# Patient Record
Sex: FEMALE | Race: WHITE | NOT HISPANIC OR LATINO | Employment: FULL TIME | URBAN - METROPOLITAN AREA
[De-identification: names, ages, dates, MRNs, and addresses within clinical notes are randomized per-mention and may not be internally consistent; named-entity substitution may affect disease eponyms.]

---

## 2018-06-06 ENCOUNTER — OFFICE VISIT (OUTPATIENT)
Dept: FAMILY MEDICINE | Facility: CLINIC | Age: 63
End: 2018-06-06
Payer: COMMERCIAL

## 2018-06-06 VITALS
HEART RATE: 91 BPM | BODY MASS INDEX: 28.02 KG/M2 | DIASTOLIC BLOOD PRESSURE: 98 MMHG | RESPIRATION RATE: 18 BRPM | SYSTOLIC BLOOD PRESSURE: 142 MMHG | WEIGHT: 184.25 LBS | OXYGEN SATURATION: 96 %

## 2018-06-06 DIAGNOSIS — G47.33 OSA (OBSTRUCTIVE SLEEP APNEA): ICD-10-CM

## 2018-06-06 DIAGNOSIS — Z23 NEED FOR VACCINATION FOR ZOSTER: ICD-10-CM

## 2018-06-06 DIAGNOSIS — Z12.39 BREAST CANCER SCREENING: ICD-10-CM

## 2018-06-06 DIAGNOSIS — R09.82 POST-NASAL DRIP: ICD-10-CM

## 2018-06-06 DIAGNOSIS — E78.2 MIXED HYPERLIPIDEMIA: ICD-10-CM

## 2018-06-06 DIAGNOSIS — F51.04 CHRONIC INSOMNIA: ICD-10-CM

## 2018-06-06 DIAGNOSIS — G62.9 NEUROPATHY: ICD-10-CM

## 2018-06-06 DIAGNOSIS — I10 BENIGN ESSENTIAL HYPERTENSION: ICD-10-CM

## 2018-06-06 DIAGNOSIS — Z23 NEED FOR DIPHTHERIA-TETANUS-PERTUSSIS (TDAP) VACCINE: ICD-10-CM

## 2018-06-06 DIAGNOSIS — E55.9 VITAMIN D DEFICIENCY: ICD-10-CM

## 2018-06-06 PROCEDURE — 99204 OFFICE O/P NEW MOD 45 MIN: CPT | Mod: 25,,, | Performed by: INTERNAL MEDICINE

## 2018-06-06 PROCEDURE — 90715 TDAP VACCINE 7 YRS/> IM: CPT | Mod: ,,, | Performed by: INTERNAL MEDICINE

## 2018-06-06 PROCEDURE — 90471 IMMUNIZATION ADMIN: CPT | Mod: ,,, | Performed by: INTERNAL MEDICINE

## 2018-06-06 RX ORDER — CLONAZEPAM 1 MG/1
1 TABLET ORAL NIGHTLY PRN
Refills: 5 | COMMUNITY
Start: 2018-05-27

## 2018-06-06 RX ORDER — GABAPENTIN 300 MG/1
CAPSULE ORAL
Refills: 99 | COMMUNITY
Start: 2018-05-27 | End: 2018-06-06

## 2018-06-06 RX ORDER — LOSARTAN POTASSIUM 50 MG/1
50 TABLET ORAL DAILY
Qty: 30 TABLET | Refills: 5 | Status: SHIPPED | OUTPATIENT
Start: 2018-06-06 | End: 2018-10-15 | Stop reason: SDUPTHER

## 2018-06-06 RX ORDER — PREGABALIN 75 MG/1
75 CAPSULE ORAL 2 TIMES DAILY
Qty: 60 CAPSULE | Refills: 2 | Status: SHIPPED | OUTPATIENT
Start: 2018-06-06 | End: 2018-09-29 | Stop reason: SDUPTHER

## 2018-06-06 RX ORDER — SPIRONOLACTONE 25 MG/1
25 TABLET ORAL DAILY
Qty: 30 TABLET | Refills: 5 | Status: SHIPPED | OUTPATIENT
Start: 2018-06-06 | End: 2018-12-04 | Stop reason: SDUPTHER

## 2018-06-06 RX ORDER — FLUTICASONE PROPIONATE 50 MCG
1 SPRAY, SUSPENSION (ML) NASAL DAILY
Qty: 1 BOTTLE | Refills: 11 | Status: SHIPPED | OUTPATIENT
Start: 2018-06-06 | End: 2020-07-01

## 2018-06-06 NOTE — PROGRESS NOTES
"                                                NEW ADULT PATIENT NOTE    Subjective:     Chief Complaint:  Establish Care      History of Present Illness:   Mavis Nettles is a 63 y.o. female   Foot neuropathy- Pt c/o numbness and neuropathic pain in B feet, worse on L.  Has been getting worse over the past few years.  H/o lumbar disc herniation s/p repair, otherwise no h/o injury to legs or nerve problems.  Pt reports that this had never been worked up, she was just put on gabapentin which has not helped. She has taken up to 900 mg QHS (she can't tolerate med during the day due to sedation) w/o improvement.   Hoarse voice- Pt c/o hoarse voice since starting on the cpap.  Notes some post nasal drip, needing to "clear her throat." Mild seasonal allergies, takes PRN zyrtec.     Past Medical History:   Diagnosis Date    Cataract     Hyperlipidemia     Hypertension        Family History   Problem Relation Age of Onset    Cancer Mother     Stroke Mother     COPD Father     Hearing loss Father     Heart disease Father     Hyperlipidemia Father     Hypertension Father     Arthritis Maternal Grandmother     Stroke Maternal Grandmother     Cancer Maternal Grandfather     Arthritis Paternal Grandmother     Depression Paternal Grandmother     Diabetes Paternal Grandmother     Alcohol abuse Paternal Grandfather        Social History     Social History    Marital status:      Spouse name: N/A    Number of children: N/A    Years of education: N/A     Occupational History    Not on file.     Social History Main Topics    Smoking status: Light Tobacco Smoker    Smokeless tobacco: Never Used    Alcohol use No    Drug use: No    Sexual activity: No     Other Topics Concern    Not on file     Social History Narrative    No narrative on file       ROS:  Review of Systems   Constitutional: Negative for activity change, appetite change, fatigue and unexpected weight change.   HENT: Negative for " congestion, ear pain, rhinorrhea, sinus pain, sinus pressure, sore throat and trouble swallowing.    Eyes: Negative for pain, redness and visual disturbance.   Respiratory: Negative for cough, chest tightness, shortness of breath and wheezing.    Cardiovascular: Negative for chest pain and palpitations.   Gastrointestinal: Negative for abdominal pain, constipation, diarrhea, nausea and vomiting.   Endocrine: Negative for cold intolerance, heat intolerance, polydipsia and polyuria.   Genitourinary: Negative for difficulty urinating, dysuria, flank pain, frequency, pelvic pain, vaginal bleeding and vaginal discharge.   Musculoskeletal: Negative for arthralgias and joint swelling.   Skin: Negative for rash.   Allergic/Immunologic: Negative for environmental allergies and food allergies.   Neurological: Positive for numbness. Negative for dizziness, seizures, syncope, weakness and headaches.   Hematological: Negative for adenopathy.   Psychiatric/Behavioral: Negative for confusion, dysphoric mood and suicidal ideas. The patient is not nervous/anxious.           Current Outpatient Prescriptions:     clonazePAM (KLONOPIN) 1 MG tablet, Take 1 mg by mouth nightly as needed for Insomnia., Disp: , Rfl: 5    losartan (COZAAR) 50 MG tablet, Take 1 tablet (50 mg total) by mouth once daily., Disp: 30 tablet, Rfl: 5    multivit-minerals/ferrous fum (MULTI VITAMIN ORAL), Multi Vitamin   1qd, Disp: , Rfl:     spironolactone (ALDACTONE) 25 MG tablet, Take 1 tablet (25 mg total) by mouth once daily., Disp: 30 tablet, Rfl: 5    fluticasone (FLONASE) 50 mcg/actuation nasal spray, 1 spray (50 mcg total) by Each Nare route once daily., Disp: 1 Bottle, Rfl: 11    pregabalin (LYRICA) 75 MG capsule, Take 1 capsule (75 mg total) by mouth 2 (two) times daily., Disp: 60 capsule, Rfl: 2    varicella-zoster gE-AS01B, PF, (SHINGRIX, PF,) 50 mcg/0.5 mL injection, Inject 0.5 mLs into the muscle once., Disp: 0.5 mL, Rfl: 0        Objective:        Physical Examination:     BP (!) 142/98 (BP Location: Left arm, Patient Position: Sitting, BP Method: Medium (Manual))   Pulse 91   Resp 18   Wt 83.6 kg (184 lb 4 oz)   SpO2 96%   BMI 28.02 kg/m²     Physical Exam   Constitutional: She is oriented to person, place, and time. She appears well-developed and well-nourished. No distress.   HENT:   Head: Normocephalic and atraumatic.   Right Ear: Tympanic membrane normal.   Left Ear: Tympanic membrane normal.   Nose: Nose normal.   Mouth/Throat: Oropharynx is clear and moist. No oropharyngeal exudate (posterior cobblestoning).   Eyes: Conjunctivae and EOM are normal. Pupils are equal, round, and reactive to light.   Neck: Normal range of motion. Neck supple. No thyromegaly present.   Cardiovascular: Normal rate, regular rhythm, normal heart sounds and intact distal pulses.    No murmur heard.  Pulmonary/Chest: Effort normal and breath sounds normal.   Abdominal: Soft. Bowel sounds are normal. She exhibits no distension and no mass. There is no tenderness. There is no guarding.   Musculoskeletal: She exhibits no edema.   Lymphadenopathy:     She has no cervical adenopathy.   Neurological: She is alert and oriented to person, place, and time. She has normal strength. No sensory deficit.   Skin: Skin is warm and dry.         Assessment/Plan:   Mavis is a 63 y.o. female here to establish care.     Problem List Items Addressed This Visit        Neuro    Neuropathy    Current Assessment & Plan     Change gabapentin to lyrica.  Check TSH, CBC, B12, ESR. Consider neuro referral for EMG/NCS.         Relevant Medications    pregabalin (LYRICA) 75 MG capsule    Other Relevant Orders    CBC auto differential    Vitamin B12    TSH    Sedimentation rate       ENT    Post-nasal drip    Relevant Medications    fluticasone (FLONASE) 50 mcg/actuation nasal spray       Cardiac/Vascular    Benign essential hypertension    Current Assessment & Plan     Well controlled on cozaar  "and aldactone, elevated here, pt reports h/o "white coat hypertension."  Continue checking at home and bring us log to next visit. CMP ordered.          Relevant Medications    spironolactone (ALDACTONE) 25 MG tablet    losartan (COZAAR) 50 MG tablet    Other Relevant Orders    Comprehensive metabolic panel    Mixed hyperlipidemia    Current Assessment & Plan     Lipids ordered.          Relevant Orders    Lipid panel       Endocrine    Vitamin D deficiency    Current Assessment & Plan     Not currently on replacement. Check level.          Relevant Orders    Vitamin D       Other    Chronic insomnia    Current Assessment & Plan     Sees Dr. Coles, has been on gabapentin and clonazepam.           AP (obstructive sleep apnea)    Current Assessment & Plan     Sees Dr. Coles, on CPAP.            Other Visit Diagnoses     BMI 28.0-28.9,adult    -  Primary    Breast cancer screening        Relevant Orders    Mammo Digital Screening Bilat with CAD    Need for diphtheria-tetanus-pertussis (Tdap) vaccine        Relevant Orders    (In Office Administered) Tdap Vaccine (Completed)    Need for vaccination for zoster        Relevant Medications    varicella-zoster gE-AS01B, PF, (SHINGRIX, PF,) 50 mcg/0.5 mL injection          Health Maintenance   Topic Date Due    Lipid Panel  1955    TETANUS VACCINE  05/11/1975    Mammogram  05/11/1997    Colonoscopy  05/11/2007    Zoster Vaccine  05/11/2017    Influenza Vaccine  08/01/2018    Hepatitis C Screening  Completed    Pneumococcal PPSV23 (Medium Risk)  Excluded       Discussion:     Follow-up in about 3 months (around 9/6/2018) for HTN, labs, neuropathy.    Goals     None          Electronically signed by Sofie Higgins        "

## 2018-06-06 NOTE — PATIENT INSTRUCTIONS
You need to fast (nothing to eat or drink besides water and medications) for 8 hours before your labs are drawn.  [facility]   Address: [address] [place] [address]   Hours:   Sunday: Closed  Monday-Friday: 6AM - 5PM  Saturday: Closed    Phone: [phone]

## 2018-06-06 NOTE — ASSESSMENT & PLAN NOTE
"Well controlled on cozaar and aldactone, elevated here, pt reports h/o "white coat hypertension."  Continue checking at home and bring us log to next visit. CMP ordered.   "

## 2018-09-20 ENCOUNTER — TELEPHONE (OUTPATIENT)
Dept: FAMILY MEDICINE | Facility: CLINIC | Age: 63
End: 2018-09-20

## 2018-09-20 NOTE — TELEPHONE ENCOUNTER
Called pt regarding below message. Informed pt she has not been seen in our clinic before. Informed pt the last visit I see was with Dr. Higgins with [facility]. Pt states she will call there office and apologized for the mistake. Pt verbalized understanding with no further questions.     ----- Message from Wanda Pierre sent at 9/19/2018  4:23 PM CDT -----  Contact: self   Patient want to speak with a nurse regarding scheduling appointment before October for medical clearance, patient said she saw doctor before there was nothing in her profile showing she saw doctor before please call back at [phone] (home) '

## 2018-09-29 DIAGNOSIS — G62.9 NEUROPATHY: ICD-10-CM

## 2018-10-01 RX ORDER — PREGABALIN 75 MG/1
CAPSULE ORAL
Qty: 60 CAPSULE | Refills: 2 | Status: SHIPPED | OUTPATIENT
Start: 2018-10-01 | End: 2018-12-30 | Stop reason: SDUPTHER

## 2018-10-15 ENCOUNTER — OFFICE VISIT (OUTPATIENT)
Dept: FAMILY MEDICINE | Facility: CLINIC | Age: 63
End: 2018-10-15
Payer: COMMERCIAL

## 2018-10-15 VITALS
HEART RATE: 88 BPM | TEMPERATURE: 98 F | BODY MASS INDEX: 28.84 KG/M2 | WEIGHT: 190.31 LBS | RESPIRATION RATE: 18 BRPM | SYSTOLIC BLOOD PRESSURE: 146 MMHG | HEIGHT: 68 IN | DIASTOLIC BLOOD PRESSURE: 86 MMHG

## 2018-10-15 DIAGNOSIS — E55.9 VITAMIN D DEFICIENCY: ICD-10-CM

## 2018-10-15 DIAGNOSIS — I10 BENIGN ESSENTIAL HYPERTENSION: ICD-10-CM

## 2018-10-15 DIAGNOSIS — Z01.818 PREOPERATIVE CLEARANCE: Primary | ICD-10-CM

## 2018-10-15 DIAGNOSIS — F51.04 CHRONIC INSOMNIA: ICD-10-CM

## 2018-10-15 DIAGNOSIS — G47.33 OSA (OBSTRUCTIVE SLEEP APNEA): ICD-10-CM

## 2018-10-15 DIAGNOSIS — E78.2 MIXED HYPERLIPIDEMIA: ICD-10-CM

## 2018-10-15 DIAGNOSIS — G62.9 NEUROPATHY: ICD-10-CM

## 2018-10-15 PROCEDURE — 99214 OFFICE O/P EST MOD 30 MIN: CPT | Mod: ,,, | Performed by: NURSE PRACTITIONER

## 2018-10-15 RX ORDER — DUREZOL 0.5 MG/ML
EMULSION OPHTHALMIC
Refills: 1 | COMMUNITY
Start: 2018-08-23 | End: 2020-07-01

## 2018-10-15 RX ORDER — OFLOXACIN 3 MG/ML
SOLUTION/ DROPS OPHTHALMIC
Refills: 1 | COMMUNITY
Start: 2018-08-23 | End: 2018-12-04

## 2018-10-15 RX ORDER — KETOROLAC TROMETHAMINE 5 MG/ML
SOLUTION OPHTHALMIC
Refills: 1 | COMMUNITY
Start: 2018-08-23 | End: 2020-07-01

## 2018-10-15 RX ORDER — LOSARTAN POTASSIUM 100 MG/1
100 TABLET ORAL DAILY
Qty: 90 TABLET | Refills: 1 | Status: SHIPPED | OUTPATIENT
Start: 2018-10-15 | End: 2018-12-04 | Stop reason: SDUPTHER

## 2018-10-15 NOTE — PATIENT INSTRUCTIONS
"  Facts About Dietary Fat     Olive oil is a good source of unsaturated fat.     Eating less saturated and trans fat is one of the best things you can do for your heart. Start by finding out which fats are better to use. Then always try to use as little "bad" fat as you can.  Why eat less fat?  · Cutting down on the fat you eat can lower your blood cholesterol levels. This may help prevent clogged arteries from buildup of plaque.  · A low-fat diet can help you lose excess weight. Doing so can lower your blood pressure and reduce your chances of getting diabetes.  · A low-fat diet reduces your risk for stroke and for some cancers.  Unsaturated fat is most healthy  · When you must add fat, use unsaturated fat.  · Unsaturated fats come from plants. They include olive, canola, peanut, corn, avocado, safflower, and sunflower oils.  · Liquid (squeezable) margarine is also mostly unsaturated fat.  · In moderate amounts, unsaturated fat can even be good for your heart.  Saturated fat is less healthy  · Avoid eating saturated fat because it raises your blood cholesterol levels.  · Most saturated fat comes from animals. Foods such as butter, lard, cheese, cream, whole milk, and fatty cuts of meat are high in saturated fat.  · Some oils, such as palm and coconut oils, are also saturated fats.  Trans fat is least healthy  · Also avoid trans fat whenever possible. Even if it's not listed on the food label, look for it in the ingredients in the form of hydrogenated or partially hydrogenated oils.  · This is found in snack foods, shortening, french fries, and stick margarines.  Add flavor without fat  · Sprinkle herbs on fish, chicken, and meat, and in soups.  · Try herbs, lemon juice, or flavored vinegar on vegetables.  · Add chopped onions, garlic, and peppers to flavor beans and rice.   Date Last Reviewed: 5/11/2015  © 6626-1536 [employer]. [address] [place] [address], [place] [address]. All rights reserved. This " information is not intended as a substitute for professional medical care. Always follow your healthcare professional's instructions.        Aerobic Exercise for a Healthy Heart  Exercise is a lot more than an energy booster and a stress reliever. It also strengthens your heart muscle, lowers your blood pressure and cholesterol, and burns calories. It can also improve your resting muscle tone, and your mood.     Remember, some activity is better than none.    Choose an aerobic activity  Choose an activity that makes your heart and lungs work harder than they do when you rest or walk normally. This aerobic exercise can improve the way your heart and other muscles use oxygen. Make it fun by exercising with a friend and choosing an activity you enjoy. Here are some ideas:  · Walking  · Swimming  · Bicycling  · Stair climbing  · Dancing  · Jogging  · Gardening  Exercise regularly  If you havent been exercising regularly,  get your doctors OK first. Then start slowly.  Here are some tips:  · Begin exercising 3 times a week for 5 to 10 minutes at a time.  · When you feel comfortable, add a few minutes each session.  · Slowly build up to exercising 3 to 4 times each week. Each session should last for 40 minutes, on average, and involve moderate- to vigorous-intensity physical activity.  · If you have been given nitroglycerin, be sure to carry it when you exercise.  · If you get chest pain (angina) when youre exercising, stop what youre doing, take your nitroglycerin, and call your doctor.  Date Last Reviewed: 6/2/2016  © 6878-8626 [employer]. [address] [place] [address], [place] [address]. All rights reserved. This information is not intended as a substitute for professional medical care. Always follow your healthcare professional's instructions.

## 2018-10-15 NOTE — PROGRESS NOTES
Subjective:       Patient ID: Mavis Nettles is a 63 y.o. female.    Chief Complaint: Cataract (surgery is scheduled for next week) and Nasal Congestion    Patient presents with:  Cataract: surgery is scheduled for next week  Nasal Congestion       Subjective:    Mavis Nettles is a 63 y.o. female who presents to the office today for a preoperative consultation at the request of surgeon Dr. Boyle who plans on performing cataract removal on October 24. This consultation is requested for the specific conditions prompting preoperative evaluation.  Planned anesthesia: Monitored sedation. The patient has the following known anesthesia issues: none. Patients bleeding risk: no recent abnormal bleeding.     Procedure Risk: low  Recent Procedures: none  Recent infections: none    Heart Disease:  Angina history No  MI>6month No If yes then 5 points  MI<6 months No If yes then 10 points  Unstable Angina No If yes then 10 points  Class III No If yes then 10 points  Class IV No If yes then 20 points  Revasc <5years No and asymptomatic No  If symptomatic then needs restudy.  Pulmonary Edema history No If yes then 5 points  Pulmonary Edema <7days No If yes then 10 points  Critical Aortic Stenosis No If yes then 20 points  Abnormal EKG No  PACs No If yes 5 points        General poor health No If yes then 5 points  Age > 70 No If yes then 10 points  CVA No  HTN Yes  COPD No Consider PFTs prior to thoracotomy  Liver Failure/Alcohol withdrawal No  Anemia No    If any yes in categories greater than or equal to 10 points marked yes, procedure should be delayed, angiography and risk-factor modification should be considered. If the presence of other clinical predictors then stress test prior to procedure.   Points total: 5   0-15 = low, 15-30 = intermediate, 31+ = high    Cardiographics  ECG: no prior ECG  Echocardiogram: not done     Assessment:      63 y.o. female with planned surgery as above.    Known risk factors for perioperative  complications: None    Difficulty with intubation is not anticipated.    Cardiac Risk Estimation: low     Plan:    1. Preoperative workup as follows : Physical exam.   2. Medications reviewed. Change in medication regimen before surgery: none, continue medications until day of surgery.  3. Prophylaxis for cardiac events with perioperative beta-blockers: not indicated. Caution in patients with COPD.   4. Invasive hemodynamic monitoring perioperatively: at the discretion of anesthesiologist.  5. Deep vein thrombosis prophylaxis postoperatively:regimen to be chosen by surgical team.  6. Other measures: Preoperative alcohol abstention x 30 days.  7. Discuss with team  8. Follow up as needed.      As with all procedures, there is inherent risk of multiple complications including bleeding,  infectious, cardiac, and pulmonary.  Please stop Aspirin and NSAIDS one week prior to surgery.    All smokers should quit smoking eight or more weeks prior to procedure to minimize complications associated with smoking. Reduction or cessation of smoking for less than four to eight weeks before surgery is of questionable benefit, and has actually been shown in some studies to result in higher complication rates.  Alcohol should be used in moderation.  Any pt with cardiopulmonary disease may warrant repeat examination prior to procedure along with directions for deep breathing exercises and incentive spirometry.    Patients at high risk for complications usually warrant cardiology consultation and possibly angiography. Cardiac stress testing should be performed in patients at intermediate risk and with poor functional capacity or who are undergoing high-risk procedures, such as vascular surgery. For patients with minor clinical predictors, only patients who have poor functional capacity and are undergoing a high-risk procedure require stress testing. Patients with positive stress test results warrant cardiology consultation before  proceeding with surgery.  Predisposing risk factors for pulmonary complications include cough, dyspnea, smoking, a history of lung disease, obesity and abdominal or thoracic surgery.    Any pts > 70 years old may be at risk for delirium or cardiac complications.  Furthermore, diabetic patients may be at risk for infection or prolonged healing.      Jossie Roa  10/15/2018 9:47 AM      Review of Systems   Constitutional: Negative for activity change, appetite change and fever.        Overweight   HENT: Negative for congestion, ear discharge, ear pain, sore throat, trouble swallowing and voice change.         Seasonal allergies   Eyes: Negative for photophobia, pain, discharge and visual disturbance.   Respiratory: Negative for cough, chest tightness and shortness of breath.    Cardiovascular: Negative for chest pain and palpitations.        Hypertension, hyperlipidemia   Gastrointestinal: Negative for abdominal pain, nausea and vomiting.   Endocrine: Negative for cold intolerance and heat intolerance.        Vitamin d deficiency   Genitourinary: Negative for difficulty urinating and dysuria.   Musculoskeletal: Negative for arthralgias and gait problem.   Skin: Negative for rash.   Allergic/Immunologic: Negative for immunocompromised state.   Neurological: Negative for speech difficulty and headaches.        Neuropathy   Psychiatric/Behavioral: Positive for sleep disturbance (insomnia). Negative for confusion, self-injury and suicidal ideas.       Past Medical History:   Diagnosis Date    Cataract     Hyperlipidemia     Hypertension       Past Surgical History:   Procedure Laterality Date    APPENDECTOMY      Cholecystectomy  15 yrs ago    CHOLECYSTECTOMY      COLON SURGERY      HYSTERECTOMY  21yrs ago    LAMINECTOMY  21yrs ago    OOPHORECTOMY  21yrs ago    SPINE SURGERY      TUBAL LIGATION  26yrs ago       Family History   Problem Relation Age of Onset    Cancer Mother     Stroke Mother     COPD  Father     Hearing loss Father     Heart disease Father     Hyperlipidemia Father     Hypertension Father     Arthritis Maternal Grandmother     Stroke Maternal Grandmother     Cancer Maternal Grandfather     Arthritis Paternal Grandmother     Depression Paternal Grandmother     Diabetes Paternal Grandmother     Alcohol abuse Paternal Grandfather        Social History     Socioeconomic History    Marital status:      Spouse name: None    Number of children: None    Years of education: None    Highest education level: None   Social Needs    Financial resource strain: None    Food insecurity - worry: None    Food insecurity - inability: None    Transportation needs - medical: None    Transportation needs - non-medical: None   Occupational History    None   Tobacco Use    Smoking status: Light Tobacco Smoker    Smokeless tobacco: Never Used   Substance and Sexual Activity    Alcohol use: No    Drug use: No    Sexual activity: No   Other Topics Concern    None   Social History Narrative    None       Current Outpatient Medications   Medication Sig Dispense Refill    clonazePAM (KLONOPIN) 1 MG tablet Take 1 mg by mouth nightly as needed for Insomnia.  5    fluticasone (FLONASE) 50 mcg/actuation nasal spray 1 spray (50 mcg total) by Each Nare route once daily. 1 Bottle 11    losartan (COZAAR) 100 MG tablet Take 1 tablet (100 mg total) by mouth once daily. 90 tablet 1    LYRICA 75 mg capsule TAKE 1 CAPSULE BY MOUTH TWICE A DAY 60 capsule 2    multivit-minerals/ferrous fum (MULTI VITAMIN ORAL) Multi Vitamin    1qd      spironolactone (ALDACTONE) 25 MG tablet Take 1 tablet (25 mg total) by mouth once daily. 30 tablet 5    DUREZOL 0.05 % Drop ophthalmic solution INSTILL 1 DROP IN THE LEFT EYE TWICE A DAY  1    ketorolac 0.5% (ACULAR) 0.5 % Drop 1 DROP IN THE LEFT EYE 4 X A DAY XWK. THEN 3X A DAY 1WK. 2X A DAY X1WK THEN 1X A DAY X1WK  1    ofloxacin (OCUFLOX) 0.3 % ophthalmic  "solution INSTILL 1 DROP IN THE LEFT EYE 4 TIMES A DAY 3 DAYS PRIOR TO SURGERY THEN 1 WEEK AFTER  1     No current facility-administered medications for this visit.        Review of patient's allergies indicates:  No Known Allergies  Objective:    HPI     Cataract      Additional comments: surgery is scheduled for next week          Last edited by Tamika Ovalle MA on 10/15/2018  9:29 AM. (History)      Blood pressure (!) 146/86, pulse 88, temperature 98.1 °F (36.7 °C), resp. rate 18, height 5' 8" (1.727 m), weight 86.3 kg (190 lb 4.8 oz). Body mass index is 28.94 kg/m².   Physical Exam   Constitutional: She is oriented to person, place, and time. She appears well-developed. She is cooperative. No distress.   overweight   HENT:   Head: Normocephalic and atraumatic.   Right Ear: Tympanic membrane and external ear normal.   Left Ear: Tympanic membrane and external ear normal.   Nose: Nose normal.   Mouth/Throat: Oropharynx is clear and moist.   Eyes: Conjunctivae, EOM and lids are normal. Pupils are equal, round, and reactive to light. Lids are everted and swept, no foreign bodies found. Right pupil is round and reactive. Left pupil is round and reactive.   Neck: Trachea normal and normal range of motion. Neck supple.   Cardiovascular: Normal rate, regular rhythm, S1 normal, S2 normal, normal heart sounds and intact distal pulses.   No murmur heard.  Pulmonary/Chest: Effort normal and breath sounds normal. No respiratory distress. She has no wheezes.   Abdominal: Soft. Bowel sounds are normal. She exhibits no distension. There is no tenderness. There is no rigidity and no guarding.   Musculoskeletal: Normal range of motion.   Lymphadenopathy:     She has no cervical adenopathy.        Right cervical: No superficial cervical adenopathy present.       Left cervical: No superficial cervical adenopathy present.     She has no axillary adenopathy.   Neurological: She is alert and oriented to person, place, and time. "   Skin: Skin is warm and dry. Capillary refill takes less than 2 seconds.   Psychiatric: She has a normal mood and affect. Her speech is normal and behavior is normal. Judgment and thought content normal. Cognition and memory are normal.   Nursing note and vitals reviewed.          Assessment:       1. Preoperative clearance    2. Benign essential hypertension    3. Mixed hyperlipidemia    4. Vitamin D deficiency    5. Chronic insomnia    6. AP (obstructive sleep apnea)    7. Neuropathy        Plan:       Mavis was seen today for cataract and nasal congestion.    Diagnoses and all orders for this visit:    Preoperative clearance  -Patient is cleared for surgery.     Benign essential hypertension  -     losartan (COZAAR) 100 MG tablet; Take 1 tablet (100 mg total) by mouth once daily.  -Patient has consistently been greater than 140/90 at home she states.  Patient's dose for losartan increased from 50 mg to 100 mg with patient agreeable to dose increase.  Patient states she has been on 50 mg for several years and has gained 10-15 lbs since that time.    Mixed hyperlipidemia  -Limit red meat, butter, fried foods, cheese, and other foods that have a lot of saturated fat. Consume more: lean meats, fish, fruits, vegetables, whole grains, beans, lentils, and nuts.  Weight loss, and 30-45 min of cardiovascular exercise daily.     Vitamin D deficiency  -Continue current supplement.    Chronic insomnia  -Continue current therapy.    AP (obstructive sleep apnea)  -Continue current therapy.    Neuropathy  -Continue with current medication and treatment plan.         Patient is cleared for surgery.

## 2018-10-24 ENCOUNTER — ANESTHESIA EVENT (OUTPATIENT)
Dept: SURGERY | Facility: AMBULARY SURGERY CENTER | Age: 63
End: 2018-10-24
Payer: COMMERCIAL

## 2018-10-24 ENCOUNTER — ANESTHESIA (OUTPATIENT)
Dept: SURGERY | Facility: AMBULARY SURGERY CENTER | Age: 63
End: 2018-10-24
Payer: COMMERCIAL

## 2018-10-24 ENCOUNTER — HOSPITAL ENCOUNTER (OUTPATIENT)
Facility: AMBULARY SURGERY CENTER | Age: 63
Discharge: HOME | End: 2018-10-24
Attending: OPHTHALMOLOGY | Admitting: OPHTHALMOLOGY
Payer: COMMERCIAL

## 2018-10-24 DIAGNOSIS — H26.9 CATARACT, RIGHT: ICD-10-CM

## 2018-10-24 PROCEDURE — 66984 XCAPSL CTRC RMVL W/O ECP: CPT | Performed by: OPHTHALMOLOGY

## 2018-10-24 PROCEDURE — D9220A PRA ANESTHESIA: Mod: CRNA,,, | Performed by: NURSE ANESTHETIST, CERTIFIED REGISTERED

## 2018-10-24 PROCEDURE — D9220A PRA ANESTHESIA: Mod: ANES,,, | Performed by: ANESTHESIOLOGY

## 2018-10-24 DEVICE — LENS 21.0 ACRYSOF: Type: IMPLANTABLE DEVICE | Site: EYE | Status: FUNCTIONAL

## 2018-10-24 RX ORDER — ONDANSETRON 2 MG/ML
INJECTION INTRAMUSCULAR; INTRAVENOUS
Status: DISCONTINUED | OUTPATIENT
Start: 2018-10-24 | End: 2018-10-24

## 2018-10-24 RX ORDER — FENTANYL CITRATE 50 UG/ML
INJECTION, SOLUTION INTRAMUSCULAR; INTRAVENOUS
Status: DISCONTINUED | OUTPATIENT
Start: 2018-10-24 | End: 2018-10-24

## 2018-10-24 RX ORDER — CYCLOPENTOLATE HYDROCHLORIDE 10 MG/ML
1 SOLUTION/ DROPS OPHTHALMIC
Status: DISCONTINUED | OUTPATIENT
Start: 2018-10-24 | End: 2018-10-24 | Stop reason: HOSPADM

## 2018-10-24 RX ORDER — PHENYLEPHRINE HYDROCHLORIDE 25 MG/ML
1 SOLUTION/ DROPS OPHTHALMIC
Status: COMPLETED | OUTPATIENT
Start: 2018-10-24 | End: 2018-10-24

## 2018-10-24 RX ORDER — CYCLOPENTOLATE HYDROCHLORIDE 10 MG/ML
1 SOLUTION/ DROPS OPHTHALMIC
Status: COMPLETED | OUTPATIENT
Start: 2018-10-24 | End: 2018-10-24

## 2018-10-24 RX ORDER — SODIUM CHLORIDE, SODIUM LACTATE, POTASSIUM CHLORIDE, CALCIUM CHLORIDE 600; 310; 30; 20 MG/100ML; MG/100ML; MG/100ML; MG/100ML
INJECTION, SOLUTION INTRAVENOUS CONTINUOUS PRN
Status: DISCONTINUED | OUTPATIENT
Start: 2018-10-24 | End: 2018-10-24

## 2018-10-24 RX ORDER — MIDAZOLAM HYDROCHLORIDE 1 MG/ML
INJECTION, SOLUTION INTRAMUSCULAR; INTRAVENOUS
Status: DISCONTINUED | OUTPATIENT
Start: 2018-10-24 | End: 2018-10-24

## 2018-10-24 RX ORDER — MIDAZOLAM HYDROCHLORIDE 1 MG/ML
INJECTION INTRAMUSCULAR; INTRAVENOUS
Status: COMPLETED
Start: 2018-10-24 | End: 2018-10-24

## 2018-10-24 RX ORDER — TROPICAMIDE 10 MG/ML
1 SOLUTION/ DROPS OPHTHALMIC
Status: DISCONTINUED | OUTPATIENT
Start: 2018-10-24 | End: 2018-10-24 | Stop reason: HOSPADM

## 2018-10-24 RX ORDER — POLYMYXIN B SULFATE AND TRIMETHOPRIM 1; 10000 MG/ML; [USP'U]/ML
SOLUTION OPHTHALMIC
Status: DISCONTINUED | OUTPATIENT
Start: 2018-10-24 | End: 2018-10-24 | Stop reason: HOSPADM

## 2018-10-24 RX ORDER — TROPICAMIDE 10 MG/ML
1 SOLUTION/ DROPS OPHTHALMIC
Status: COMPLETED | OUTPATIENT
Start: 2018-10-24 | End: 2018-10-24

## 2018-10-24 RX ORDER — LIDOCAINE HYDROCHLORIDE 40 MG/ML
1 INJECTION, SOLUTION RETROBULBAR
Status: COMPLETED | OUTPATIENT
Start: 2018-10-24 | End: 2018-10-24

## 2018-10-24 RX ORDER — PHENYLEPHRINE HYDROCHLORIDE 100 MG/ML
1 SOLUTION/ DROPS OPHTHALMIC
Status: DISCONTINUED | OUTPATIENT
Start: 2018-10-24 | End: 2018-10-24 | Stop reason: HOSPADM

## 2018-10-24 RX ORDER — ONDANSETRON 2 MG/ML
INJECTION INTRAMUSCULAR; INTRAVENOUS
Status: COMPLETED
Start: 2018-10-24 | End: 2018-10-24

## 2018-10-24 RX ORDER — PROPARACAINE HYDROCHLORIDE 5 MG/ML
1 SOLUTION/ DROPS OPHTHALMIC
Status: DISCONTINUED | OUTPATIENT
Start: 2018-10-24 | End: 2018-10-24 | Stop reason: HOSPADM

## 2018-10-24 RX ORDER — SODIUM CHLORIDE 0.9 % (FLUSH) 0.9 %
3 SYRINGE (ML) INJECTION EVERY 8 HOURS
Status: DISCONTINUED | OUTPATIENT
Start: 2018-10-24 | End: 2018-10-24 | Stop reason: HOSPADM

## 2018-10-24 RX ORDER — FENTANYL CITRATE 50 UG/ML
INJECTION, SOLUTION INTRAMUSCULAR; INTRAVENOUS
Status: COMPLETED
Start: 2018-10-24 | End: 2018-10-24

## 2018-10-24 RX ADMIN — LIDOCAINE HYDROCHLORIDE 1 DROP: 40 INJECTION, SOLUTION RETROBULBAR at 06:10

## 2018-10-24 RX ADMIN — PHENYLEPHRINE HYDROCHLORIDE 1 DROP: 25 SOLUTION/ DROPS OPHTHALMIC at 07:10

## 2018-10-24 RX ADMIN — ONDANSETRON 4 MG: 2 INJECTION INTRAMUSCULAR; INTRAVENOUS at 08:10

## 2018-10-24 RX ADMIN — MIDAZOLAM HYDROCHLORIDE 2 MG: 1 INJECTION, SOLUTION INTRAMUSCULAR; INTRAVENOUS at 08:10

## 2018-10-24 RX ADMIN — SODIUM CHLORIDE, SODIUM LACTATE, POTASSIUM CHLORIDE, CALCIUM CHLORIDE: 600; 310; 30; 20 INJECTION, SOLUTION INTRAVENOUS at 08:10

## 2018-10-24 RX ADMIN — CYCLOPENTOLATE HYDROCHLORIDE 1 DROP: 10 SOLUTION/ DROPS OPHTHALMIC at 07:10

## 2018-10-24 RX ADMIN — TROPICAMIDE 1 DROP: 10 SOLUTION/ DROPS OPHTHALMIC at 07:10

## 2018-10-24 RX ADMIN — LIDOCAINE HYDROCHLORIDE 1 DROP: 40 INJECTION, SOLUTION RETROBULBAR at 08:10

## 2018-10-24 RX ADMIN — PROPARACAINE HYDROCHLORIDE 1 DROP: 5 SOLUTION/ DROPS OPHTHALMIC at 07:10

## 2018-10-24 RX ADMIN — FENTANYL CITRATE 50 MCG: 50 INJECTION, SOLUTION INTRAMUSCULAR; INTRAVENOUS at 08:10

## 2018-10-24 NOTE — ANESTHESIA POSTPROCEDURE EVALUATION
"Anesthesia Post Evaluation    Patient: Mavis Nettles    Procedure(s) Performed: Procedure(s) (LRB):  EXTRACTION, CATARACT, WITH IOL INSERTION (Right)    Final Anesthesia Type: MAC  Patient location during evaluation: PACU  Patient participation: Yes- Able to Participate  Level of consciousness: awake and alert and oriented  Post-procedure vital signs: reviewed and stable  Pain management: adequate  Airway patency: patent  PONV status at discharge: No PONV  Anesthetic complications: no      Cardiovascular status: blood pressure returned to baseline and stable  Respiratory status: unassisted and spontaneous ventilation  Hydration status: euvolemic  Follow-up not needed.        Visit Vitals  BP (!) 115/59   Pulse 62   Temp 36.9 °C (98.4 °F)   Resp 20   Ht 5' 8" (1.727 m)   Wt 86.2 kg (190 lb)   SpO2 96%   Breastfeeding? No   BMI 28.89 kg/m²       Pain/Leilani Score: Pain Assessment Performed: Yes (10/24/2018  9:15 AM)  Presence of Pain: denies (10/24/2018  9:15 AM)  Leilani Score: 10 (10/24/2018  9:15 AM)        "

## 2018-10-24 NOTE — DISCHARGE INSTRUCTIONS
Anesthesia information    Anesthesia Safety      You have been given medicine  to sedate you during your procedure today. This may have included both a pain medicine and sleeping medicine. Most of the effects have worn off; however, you may continue to have some drowsiness for the next  24 hours. Anesthesia and pain medicines can cause nausea, sleepiness, dizziness and  constipation.    HOME CARE:  1) For the next EIGHT HOURS, you should be watched by a responsible adult to look for any worsening of your condition.  2) DO NOT DRINK any ALCOHOL for the next 24 HOURS.  3) DO NOT DRIVE or operate dangerous machinery during the next 24 HOURS.  FOLLOW UP with your doctor or this facility if you are not alert and back to your usual level of activity within 24 hrs.  GET PROMPT MEDICAL ATTENTION if any of the following occur:  -- Increased drowsiness  -- Increased weakness or dizziness  -- Repeated vomiting  -- If you cannot be awakened    After Cataract Surgery    You may remove your shield on the day of surgery. You may see double and your vision may be blurry when the patch is first removed. If you are uncomfortable with these symptoms, you may leave  the shield on    Tape the eye sheild over your eye at bedtime and naptime for 7 days. Tape is provided in your kit.    Do not vigorously press or rub your eye after surgery for 6 weeks.    Clean around your eyelids regularly being careful not to press on the upper lid.    You may shower, bathe and shampoo but avoid getting water, soap or shampoo in your eye for 2 weeks.  No swimming for 2 weeks.    Resume normal activities tomorrow, but no lifting or bending for 1 week.  Rest today. Reading, writing or watching TV are fine.     No eye makeup for 1 week, no eye creams for 24 hrs. Caution using perfumes or colognes.    You may be sensitive to light and  may wear sunglasses provided when outdoors     Your glasses may not be the right strength for your eye after surgery.  You  may wear your old glasses or go without.  You may find [employer] reading glasses helpful.    Symptoms you may experience the first day:  Blurry vision, Double Vision, Redish color to objects, Sensitivity to light, flickering light, Burning or scratchy feeling in eye.    Serious Problems that could arise that need immediate reporting to the Dr:     A sudden Decrease or loss of vision in either eye  Your vision is becoming worse instead of better each day  You develop a shadow in your vision  You see flashing lights  You have a continuous ache or increased pain not relieved by over the counter pain medication.    If you have any questions, please re read this carefully. If your  Question is still not answered call the office. [phone] . We are here to help you!    Emergency after office hours number is     Using eye drops:  Wash hands, shake drops well, tilt head back, open eye and look up, gently pull lower eyelid down to form a pocket, place drop into the pocket formed, close eye    Be careful not to touch your eyelashes or finger to the tip of the bottle. Allow 5 m between each drop.    Durezol 1 drop 4x daily- every 2 hrs til bedtime today, then decreasing dosage schedule  Ofloxacin 1 drop 4x daily  Ketorolac 1 drop 4 x daily

## 2018-10-24 NOTE — TRANSFER OF CARE
"Anesthesia Transfer of Care Note    Patient: Mavis Nettles    Procedure(s) Performed: Procedure(s) (LRB):  EXTRACTION, CATARACT, WITH IOL INSERTION (Right)    Patient location: PACU    Anesthesia Type: MAC    Transport from OR: Transported from OR on room air with adequate spontaneous ventilation    Post pain: adequate analgesia    Post assessment: no apparent anesthetic complications and tolerated procedure well    Post vital signs: stable    Level of consciousness: awake    Nausea/Vomiting: no nausea/vomiting    Complications: none    Transfer of care protocol was followed      Last vitals:   Visit Vitals  /60   Pulse 61   Temp 36.9 °C (98.4 °F)   Resp 18   Ht 5' 8" (1.727 m)   Wt 86.2 kg (190 lb)   SpO2 (!) 94%   Breastfeeding? No   BMI 28.89 kg/m²     "

## 2018-10-24 NOTE — BRIEF OP NOTE
[facility]  Brief Operative Note     SUMMARY     Surgery Date: 10/24/2018     Surgeon(s) and Role:     * Marito Boyle II, MD - Primary    Assisting Surgeon: None    Pre-op Diagnosis:  Nuclear sclerotic cataract of right eye [H25.11]    Post-op Diagnosis:  Post-Op Diagnosis Codes:     * Nuclear sclerotic cataract of right eye [H25.11]    Procedure(s) (LRB):  EXTRACTION, CATARACT, WITH IOL INSERTION (Right)    Anesthesia: Monitor Anesthesia Care    Description of the findings of the procedure: cat ext    Findings/Key Components: iol inserted    Estimated Blood Loss: * No values recorded between 10/24/2018  9:00 AM and 10/24/2018  9:13 AM *         Specimens:   Specimen (12h ago, onward)    None          Discharge Note    SUMMARY     Admit Date: 10/24/2018    Discharge Date and Time:  10/24/2018 9:18 AM    Hospital Course (synopsis of major diagnoses, care, treatment, and services provided during the course of the hospital stay): outpatient     Final Diagnosis: Post-Op Diagnosis Codes:     * Nuclear sclerotic cataract of right eye [H25.11]    Disposition: Home or Self Care    Follow Up/Patient Instructions:     Medications:  Reconciled Home Medications:      Medication List      CONTINUE taking these medications    clonazePAM 1 MG tablet  Commonly known as:  KLONOPIN  Take 1 mg by mouth nightly as needed for Insomnia.     DUREZOL 0.05 % Drop ophthalmic solution  Generic drug:  difluprednate  INSTILL 1 DROP IN THE LEFT EYE TWICE A DAY     fluticasone 50 mcg/actuation nasal spray  Commonly known as:  FLONASE  1 spray (50 mcg total) by Each Nare route once daily.     ketorolac 0.5% 0.5 % Drop  Commonly known as:  ACULAR  1 DROP IN THE LEFT EYE 4 X A DAY XWK. THEN 3X A DAY 1WK. 2X A DAY X1WK THEN 1X A DAY X1WK     losartan 100 MG tablet  Commonly known as:  COZAAR  Take 1 tablet (100 mg total) by mouth once daily.     LYRICA 75 MG capsule  Generic drug:  pregabalin  TAKE 1 CAPSULE BY MOUTH TWICE A  DAY     MULTI VITAMIN ORAL  Multi Vitamin    1qd     ofloxacin 0.3 % ophthalmic solution  Commonly known as:  OCUFLOX  INSTILL 1 DROP IN THE LEFT EYE 4 TIMES A DAY 3 DAYS PRIOR TO SURGERY THEN 1 WEEK AFTER     spironolactone 25 MG tablet  Commonly known as:  ALDACTONE  Take 1 tablet (25 mg total) by mouth once daily.          Discharge Procedure Orders   Diet Adult Regular     Lifting restrictions     Call MD for:  temperature >100.4     Call MD for:  persistent nausea and vomiting or diarrhea     Call MD for:  severe uncontrolled pain     Call MD for:  redness, tenderness, or signs of infection (pain, swelling, redness, odor or green/yellow discharge around incision site)     Call MD for:   Order Comments: Call MD if vision deteriorates     Leave dressing on - Keep it clean, dry, and intact until clinic visit   Order Comments: Remove shield to insert eye drops.     Follow-up Information     Marito Boyle II, MD In 1 day.    Specialty:  Ophthalmology  Contact information:  [address] [address]  [phone]

## 2018-10-24 NOTE — ANESTHESIA PREPROCEDURE EVALUATION
10/24/2018  Mavis Nettles is a 63 y.o., female.    Anesthesia Evaluation    I have reviewed the Patient Summary Reports.    I have reviewed the Nursing Notes.   I have reviewed the Medications.     Review of Systems  Anesthesia Hx:  No problems with previous Anesthesia    Social:  Smoker    Cardiovascular:   Hypertension, well controlled hyperlipidemia    Pulmonary:   Sleep Apnea    Renal/:  Renal/ Normal     Neurological:   Peripheral Neuropathy    Endocrine:  Endocrine Normal        Physical Exam  General:  Well nourished    Airway/Jaw/Neck:  Airway Findings: Mouth Opening: Normal Tongue: Normal  General Airway Assessment: Adult  Oropharynx Findings:  Mallampati: II  Jaw/Neck Findings:  Neck ROM: Normal ROM     Eyes/Ears/Nose:  Eyes/Ears/Nose Findings:    Dental:  Dental Findings:   Chest/Lungs:  Chest/Lungs Findings: Normal Respiratory Rate     Heart/Vascular:  Heart Findings: Rate: Normal  Rhythm: Regular Rhythm        Mental Status:  Mental Status Findings:  Cooperative, Alert and Oriented         Anesthesia Plan  Type of Anesthesia, risks & benefits discussed:  Anesthesia Type:  general, MAC  Patient's Preference:   Intra-op Monitoring Plan: standard ASA monitors  Intra-op Monitoring Plan Comments:   Post Op Pain Control Plan: multimodal analgesia  Post Op Pain Control Plan Comments:   Induction:   IV  Beta Blocker:  Patient is not currently on a Beta-Blocker (No further documentation required).       Informed Consent: Patient understands risks and agrees with Anesthesia plan.  Questions answered. Anesthesia consent signed with patient.  ASA Score: 2     Day of Surgery Review of History & Physical:  There are no significant changes.   H&P completed by Anesthesiologist.       Ready For Surgery From Anesthesia Perspective.

## 2018-10-24 NOTE — OP NOTE
[facility]  Opthalmology  Operative Note    SUMMARY     Date of Procedure: 10/24/2018     Procedure: Procedure(s) (LRB):  EXTRACTION, CATARACT, WITH IOL INSERTION (Right)       Surgeon(s) and Role:     * Marito Boyle II, MD - Primary      Pre-Operative Diagnosis: Cataract of right eye.    Post-Operative Diagnosis: Cataract of right eye.    Anesthesia: Topical    Procedure in Detail: The patient was brought from the Day Surgery Unit to the holding area where intravenous infusion was started and multiple doses of 4% lidocaine instilled into the right eye to achieve topical anesthesia.  The patient was then brought to the operating room where the operative field was prepped with Betadine and draped with sterile drapes.  The patient was then positioned under the operating microscope where a self retaining wire lid speculum was placed into the operative eye.  A partial thickness temporal clear corneal groove incision was then made near the limbus approximately 3mm in length using a 15 degree blade.  The same 15 degree blade was then used to create a limbal side port incision.  Additional intracameral anesthesia was obtained by the injection of 1% preservative free lidocaine through the side port incision.  The anterior chamber was then re-deepened by the injection of Viscoat through the side port incision.  The previously made temporal groove incision was then converted to a clear corneal valve incision using a 2.5 mm keratome.  A cystotome was then passed through this temporal clear corneal incision and used to raise an anterior lenticular capsular flap which was then grasped with Utrata forceps and a circular tear capsulotomy performed.  Hydrodissection of the lens nucleus from its surrounding cortical and capsular attachments was accomplished by the injection of Balanced Salt Solution through a 27 gauge flat hydrodissection cannula.   The phacoemulsification tip was then passed through the  temporal incision and used to sculpt the central nucleus and to sculpt a peripheral nuclear groove nasally.  A Knolle spatula was then passed through the side port incision and used to rotate the nucleus 90 degrees clockwise where a second peripheral nuclear groove was sculpted.  In like fashion a third and fourth groove were sculpted at 90 degrees intervals until the depths of the groove were sufficiently thin to allow fracture the nucleus into 4 fragments which were then individually emulsified in the pupillary plane.  The phacoemulsification tip was then withdrawn and the irrigation/aspiration cannula used to aspirate residual cortical material from the capsular bag.  The capsular bag was then reinflated and the anterior chamber redeepened by the injection of Provisc.  A posterior chamber intraocular lens was then placed into its insertion device and injected through the temporal clear corneal incision such that the lens was placed entirely inside the capsular bag.  The irrigation/aspiration cannula ws then used once again to aspirate residual Viscoelastic from the anterior chamber.  At the end of this procedure the anterior chamber was redeepened by the injection of additional Balanced Salt Solution through the side port incision.  The side port incision and temporal clear corneal incision were then tested with Weck-xochitl sponges and found to be watertight.  The eye was then irrigated with antibiotic solution after which the lid speculum and drapes were removed and an eye shield applied.  The patient tolerated the procedure well and was taken from the operating room in satisfactory condition.                  Estimated Blood Loss (EBL): * No values recorded between 10/24/2018  9:00 AM and 10/24/2018  9:13 AM *           Implants:   Implant Name Type Inv. Item Serial No.  Lot No. LRB No. Used   LENS 21.0 ACRYSOF - [id number]  LENS 21.0 ACRYSOF [id number] Exclusively.in  Right 1           Disposition:  Home/Self Care

## 2018-10-25 VITALS
WEIGHT: 190 LBS | TEMPERATURE: 98 F | OXYGEN SATURATION: 96 % | DIASTOLIC BLOOD PRESSURE: 66 MMHG | SYSTOLIC BLOOD PRESSURE: 110 MMHG | HEIGHT: 68 IN | BODY MASS INDEX: 28.79 KG/M2 | RESPIRATION RATE: 20 BRPM | HEART RATE: 55 BPM

## 2018-11-13 ENCOUNTER — ANESTHESIA EVENT (OUTPATIENT)
Dept: SURGERY | Facility: AMBULARY SURGERY CENTER | Age: 63
End: 2018-11-13
Payer: COMMERCIAL

## 2018-11-14 ENCOUNTER — ANESTHESIA (OUTPATIENT)
Dept: SURGERY | Facility: AMBULARY SURGERY CENTER | Age: 63
End: 2018-11-14
Payer: COMMERCIAL

## 2018-11-14 ENCOUNTER — HOSPITAL ENCOUNTER (OUTPATIENT)
Facility: AMBULARY SURGERY CENTER | Age: 63
Discharge: HOME | End: 2018-11-14
Attending: OPHTHALMOLOGY | Admitting: OPHTHALMOLOGY
Payer: COMMERCIAL

## 2018-11-14 DIAGNOSIS — H26.9 CATARACT, LEFT: ICD-10-CM

## 2018-11-14 PROCEDURE — 66984 XCAPSL CTRC RMVL W/O ECP: CPT | Performed by: OPHTHALMOLOGY

## 2018-11-14 PROCEDURE — D9220A PRA ANESTHESIA: Mod: CRNA,,, | Performed by: NURSE ANESTHETIST, CERTIFIED REGISTERED

## 2018-11-14 PROCEDURE — D9220A PRA ANESTHESIA: Mod: ANES,,, | Performed by: ANESTHESIOLOGY

## 2018-11-14 RX ORDER — SODIUM CHLORIDE, SODIUM LACTATE, POTASSIUM CHLORIDE, CALCIUM CHLORIDE 600; 310; 30; 20 MG/100ML; MG/100ML; MG/100ML; MG/100ML
125 INJECTION, SOLUTION INTRAVENOUS CONTINUOUS
Status: DISCONTINUED | OUTPATIENT
Start: 2018-11-14 | End: 2018-11-14 | Stop reason: HOSPADM

## 2018-11-14 RX ORDER — FENTANYL CITRATE 50 UG/ML
INJECTION, SOLUTION INTRAMUSCULAR; INTRAVENOUS
Status: COMPLETED
Start: 2018-11-14 | End: 2018-11-14

## 2018-11-14 RX ORDER — CYCLOPENTOLATE HYDROCHLORIDE 10 MG/ML
1 SOLUTION/ DROPS OPHTHALMIC
Status: DISCONTINUED | OUTPATIENT
Start: 2018-11-14 | End: 2018-11-14 | Stop reason: HOSPADM

## 2018-11-14 RX ORDER — PHENYLEPHRINE HYDROCHLORIDE 100 MG/ML
1 SOLUTION/ DROPS OPHTHALMIC
Status: DISCONTINUED | OUTPATIENT
Start: 2018-11-14 | End: 2018-11-14 | Stop reason: HOSPADM

## 2018-11-14 RX ORDER — POLYMYXIN B SULFATE AND TRIMETHOPRIM 1; 10000 MG/ML; [USP'U]/ML
SOLUTION OPHTHALMIC
Status: DISCONTINUED | OUTPATIENT
Start: 2018-11-14 | End: 2018-11-14 | Stop reason: HOSPADM

## 2018-11-14 RX ORDER — PROPARACAINE HYDROCHLORIDE 5 MG/ML
1 SOLUTION/ DROPS OPHTHALMIC
Status: DISCONTINUED | OUTPATIENT
Start: 2018-11-14 | End: 2018-11-14 | Stop reason: HOSPADM

## 2018-11-14 RX ORDER — ONDANSETRON 2 MG/ML
INJECTION INTRAMUSCULAR; INTRAVENOUS
Status: COMPLETED
Start: 2018-11-14 | End: 2018-11-14

## 2018-11-14 RX ORDER — CIPROFLOXACIN HYDROCHLORIDE 3 MG/ML
SOLUTION/ DROPS OPHTHALMIC
Status: DISCONTINUED
Start: 2018-11-14 | End: 2018-11-14 | Stop reason: HOSPADM

## 2018-11-14 RX ORDER — PROPARACAINE HYDROCHLORIDE 5 MG/ML
SOLUTION/ DROPS OPHTHALMIC
Status: DISCONTINUED | OUTPATIENT
Start: 2018-11-14 | End: 2018-11-14 | Stop reason: HOSPADM

## 2018-11-14 RX ORDER — ONDANSETRON 2 MG/ML
INJECTION INTRAMUSCULAR; INTRAVENOUS
Status: DISCONTINUED | OUTPATIENT
Start: 2018-11-14 | End: 2018-11-14

## 2018-11-14 RX ORDER — SODIUM CHLORIDE, SODIUM LACTATE, POTASSIUM CHLORIDE, CALCIUM CHLORIDE 600; 310; 30; 20 MG/100ML; MG/100ML; MG/100ML; MG/100ML
INJECTION, SOLUTION INTRAVENOUS CONTINUOUS
Status: DISCONTINUED | OUTPATIENT
Start: 2018-11-14 | End: 2018-11-14 | Stop reason: HOSPADM

## 2018-11-14 RX ORDER — TROPICAMIDE 10 MG/ML
1 SOLUTION/ DROPS OPHTHALMIC
Status: DISCONTINUED | OUTPATIENT
Start: 2018-11-14 | End: 2018-11-14 | Stop reason: HOSPADM

## 2018-11-14 RX ORDER — OXYCODONE HYDROCHLORIDE 5 MG/1
5 TABLET ORAL ONCE AS NEEDED
Status: DISCONTINUED | OUTPATIENT
Start: 2018-11-14 | End: 2018-11-14 | Stop reason: HOSPADM

## 2018-11-14 RX ORDER — MIDAZOLAM HYDROCHLORIDE 1 MG/ML
INJECTION, SOLUTION INTRAMUSCULAR; INTRAVENOUS
Status: DISCONTINUED | OUTPATIENT
Start: 2018-11-14 | End: 2018-11-14

## 2018-11-14 RX ORDER — PHENYLEPHRINE HYDROCHLORIDE 25 MG/ML
1 SOLUTION/ DROPS OPHTHALMIC
Status: COMPLETED | OUTPATIENT
Start: 2018-11-14 | End: 2018-11-14

## 2018-11-14 RX ORDER — LIDOCAINE HYDROCHLORIDE 10 MG/ML
1 INJECTION, SOLUTION EPIDURAL; INFILTRATION; INTRACAUDAL; PERINEURAL ONCE
Status: DISCONTINUED | OUTPATIENT
Start: 2018-11-14 | End: 2018-11-14 | Stop reason: HOSPADM

## 2018-11-14 RX ORDER — FENTANYL CITRATE 50 UG/ML
25 INJECTION, SOLUTION INTRAMUSCULAR; INTRAVENOUS EVERY 5 MIN PRN
Status: DISCONTINUED | OUTPATIENT
Start: 2018-11-14 | End: 2018-11-14 | Stop reason: HOSPADM

## 2018-11-14 RX ORDER — FENTANYL CITRATE 50 UG/ML
INJECTION, SOLUTION INTRAMUSCULAR; INTRAVENOUS
Status: DISCONTINUED | OUTPATIENT
Start: 2018-11-14 | End: 2018-11-14

## 2018-11-14 RX ORDER — EPINEPHRINE 1 MG/ML
INJECTION, SOLUTION INTRACARDIAC; INTRAMUSCULAR; INTRAVENOUS; SUBCUTANEOUS
Status: DISCONTINUED
Start: 2018-11-14 | End: 2018-11-14 | Stop reason: HOSPADM

## 2018-11-14 RX ORDER — TROPICAMIDE 10 MG/ML
1 SOLUTION/ DROPS OPHTHALMIC
Status: COMPLETED | OUTPATIENT
Start: 2018-11-14 | End: 2018-11-14

## 2018-11-14 RX ORDER — CYCLOPENTOLATE HYDROCHLORIDE 10 MG/ML
1 SOLUTION/ DROPS OPHTHALMIC
Status: COMPLETED | OUTPATIENT
Start: 2018-11-14 | End: 2018-11-14

## 2018-11-14 RX ORDER — ONDANSETRON 2 MG/ML
4 INJECTION INTRAMUSCULAR; INTRAVENOUS ONCE AS NEEDED
Status: DISCONTINUED | OUTPATIENT
Start: 2018-11-14 | End: 2018-11-14 | Stop reason: HOSPADM

## 2018-11-14 RX ORDER — POLYMYXIN B SULFATE AND TRIMETHOPRIM 1; 10000 MG/ML; [USP'U]/ML
SOLUTION OPHTHALMIC
Status: DISCONTINUED
Start: 2018-11-14 | End: 2018-11-14 | Stop reason: HOSPADM

## 2018-11-14 RX ORDER — LIDOCAINE HYDROCHLORIDE 40 MG/ML
1 INJECTION, SOLUTION RETROBULBAR
Status: COMPLETED | OUTPATIENT
Start: 2018-11-14 | End: 2018-11-14

## 2018-11-14 RX ORDER — MIDAZOLAM HYDROCHLORIDE 1 MG/ML
INJECTION INTRAMUSCULAR; INTRAVENOUS
Status: COMPLETED
Start: 2018-11-14 | End: 2018-11-14

## 2018-11-14 RX ADMIN — PROPARACAINE HYDROCHLORIDE 1 DROP: 5 SOLUTION/ DROPS OPHTHALMIC at 07:11

## 2018-11-14 RX ADMIN — LIDOCAINE HYDROCHLORIDE 1 DROP: 40 INJECTION, SOLUTION RETROBULBAR at 07:11

## 2018-11-14 RX ADMIN — PROPARACAINE HYDROCHLORIDE 1 DROP: 5 SOLUTION/ DROPS OPHTHALMIC at 06:11

## 2018-11-14 RX ADMIN — FENTANYL CITRATE 50 MCG: 50 INJECTION, SOLUTION INTRAMUSCULAR; INTRAVENOUS at 07:11

## 2018-11-14 RX ADMIN — PHENYLEPHRINE HYDROCHLORIDE 1 DROP: 25 SOLUTION/ DROPS OPHTHALMIC at 06:11

## 2018-11-14 RX ADMIN — MIDAZOLAM HYDROCHLORIDE 2 MG: 1 INJECTION, SOLUTION INTRAMUSCULAR; INTRAVENOUS at 07:11

## 2018-11-14 RX ADMIN — ONDANSETRON 4 MG: 2 INJECTION INTRAMUSCULAR; INTRAVENOUS at 07:11

## 2018-11-14 RX ADMIN — CYCLOPENTOLATE HYDROCHLORIDE 1 DROP: 10 SOLUTION/ DROPS OPHTHALMIC at 06:11

## 2018-11-14 RX ADMIN — TROPICAMIDE 1 DROP: 10 SOLUTION/ DROPS OPHTHALMIC at 06:11

## 2018-11-14 RX ADMIN — SODIUM CHLORIDE, SODIUM LACTATE, POTASSIUM CHLORIDE, CALCIUM CHLORIDE: 600; 310; 30; 20 INJECTION, SOLUTION INTRAVENOUS at 06:11

## 2018-11-14 NOTE — TRANSFER OF CARE
"Anesthesia Transfer of Care Note    Patient: Mavis Nettles    Procedure(s) Performed: Procedure(s) (LRB):  EXTRACTION, CATARACT, WITH IOL INSERTION (Left)    Patient location: PACU    Anesthesia Type: MAC    Transport from OR: Transported from OR on room air with adequate spontaneous ventilation    Post assessment: no apparent anesthetic complications    Post vital signs: stable    Level of consciousness: awake    Nausea/Vomiting: no nausea/vomiting    Complications: none    Transfer of care protocol was followed      Last vitals:   Visit Vitals  /60   Pulse 70   Temp 37.1 °C (98.8 °F) (Skin)   Resp 18   Ht 5' 8" (1.727 m)   Wt 86.2 kg (190 lb)   SpO2 (!) 94%   Breastfeeding? No   BMI 28.89 kg/m²     "

## 2018-11-14 NOTE — BRIEF OP NOTE
[facility]  Brief Operative Note     SUMMARY     Surgery Date: 11/14/2018     Surgeon(s) and Role:     * Marito Boyle II, MD - Primary    Assisting Surgeon: None    Pre-op Diagnosis:  Nuclear sclerotic cataract of left eye [H25.12]    Post-op Diagnosis:  Post-Op Diagnosis Codes:     * Nuclear sclerotic cataract of left eye [H25.12]    Procedure(s) (LRB):  EXTRACTION, CATARACT, WITH IOL INSERTION (Left)    Anesthesia: Monitor Anesthesia Care    Description of the findings of the procedure: cat ext    Findings/Key Components: iol inserted    Estimated Blood Loss: * No values recorded between 11/14/2018  7:44 AM and 11/14/2018  7:57 AM *         Specimens:   Specimen (12h ago, onward)    None          Discharge Note    SUMMARY     Admit Date: 11/14/2018    Discharge Date and Time:  11/14/2018 8:01 AM    Hospital Course (synopsis of major diagnoses, care, treatment, and services provided during the course of the hospital stay): outpatient     Final Diagnosis: Post-Op Diagnosis Codes:     * Nuclear sclerotic cataract of left eye [H25.12]    Disposition: Home or Self Care    Follow Up/Patient Instructions:     Medications:  Reconciled Home Medications:      Medication List      CONTINUE taking these medications    clonazePAM 1 MG tablet  Commonly known as:  KLONOPIN  Take 1 mg by mouth nightly as needed for Insomnia.     DUREZOL 0.05 % Drop ophthalmic solution  Generic drug:  difluprednate  INSTILL 1 DROP IN THE LEFT EYE TWICE A DAY     fluticasone 50 mcg/actuation nasal spray  Commonly known as:  FLONASE  1 spray (50 mcg total) by Each Nare route once daily.     ketorolac 0.5% 0.5 % Drop  Commonly known as:  ACULAR  1 DROP IN THE LEFT EYE 4 X A DAY XWK. THEN 3X A DAY 1WK. 2X A DAY X1WK THEN 1X A DAY X1WK     losartan 100 MG tablet  Commonly known as:  COZAAR  Take 1 tablet (100 mg total) by mouth once daily.     LYRICA 75 MG capsule  Generic drug:  pregabalin  TAKE 1 CAPSULE BY MOUTH TWICE A DAY      MULTI VITAMIN ORAL  Multi Vitamin    1qd     ofloxacin 0.3 % ophthalmic solution  Commonly known as:  OCUFLOX  INSTILL 1 DROP IN THE LEFT EYE 4 TIMES A DAY 3 DAYS PRIOR TO SURGERY THEN 1 WEEK AFTER     spironolactone 25 MG tablet  Commonly known as:  ALDACTONE  Take 1 tablet (25 mg total) by mouth once daily.          Discharge Procedure Orders   Lifting restrictions     Call MD for:  temperature >100.4     Call MD for:  persistent nausea and vomiting or diarrhea     Call MD for:  severe uncontrolled pain     Call MD for:  redness, tenderness, or signs of infection (pain, swelling, redness, odor or green/yellow discharge around incision site)     Call MD for:   Order Comments: Call MD if vision deteriorates     Leave dressing on - Keep it clean, dry, and intact until clinic visit   Order Comments: Remove shield to insert eye drops.     Follow-up Information     Marito Boyle II, MD In 1 day.    Specialty:  Ophthalmology  Contact information:  [address] [address]  [phone]

## 2018-11-14 NOTE — ANESTHESIA PREPROCEDURE EVALUATION
11/14/2018  Mavis Nettles is a 63 y.o., female.    Pre-op Assessment    I have reviewed the Patient Summary Reports.     I have reviewed the Nursing Notes.   I have reviewed the Medications.     Review of Systems  Anesthesia Hx:  No problems with previous Anesthesia    Social:  Smoker    EENT/Dental:   Nuclear sclerotic cataract of left eye   Cardiovascular:   Hypertension, well controlled hyperlipidemia    Pulmonary:   Sleep Apnea    Renal/:  Renal/ Normal     Neurological:   Peripheral Neuropathy    Endocrine:  Endocrine Normal        Physical Exam  General:  Well nourished    Airway/Jaw/Neck:  Airway Findings: Mouth Opening: Normal Tongue: Normal  General Airway Assessment: Adult  Oropharynx Findings:  Mallampati: II  Jaw/Neck Findings:  Neck ROM: Normal ROM     Eyes/Ears/Nose:  Eyes/Ears/Nose Findings:    Dental:  Dental Findings: In tact   Chest/Lungs:  Chest/Lungs Findings: Normal Respiratory Rate, Clear to auscultation     Heart/Vascular:  Heart Findings: Rate: Normal  Rhythm: Regular Rhythm        Mental Status:  Mental Status Findings:  Cooperative, Alert and Oriented         Anesthesia Plan  Type of Anesthesia, risks & benefits discussed:  Anesthesia Type:  MAC  Patient's Preference:   Intra-op Monitoring Plan: standard ASA monitors  Intra-op Monitoring Plan Comments:   Post Op Pain Control Plan: multimodal analgesia  Post Op Pain Control Plan Comments:   Induction:   IV  Beta Blocker:  Patient is not currently on a Beta-Blocker (No further documentation required).       Informed Consent: Patient understands risks and agrees with Anesthesia plan.  Questions answered. Anesthesia consent signed with patient.  ASA Score: 2     Day of Surgery Review of History & Physical:  There are no significant changes.  H&P update referred to the surgeon.         Ready For Surgery From Anesthesia Perspective.

## 2018-11-14 NOTE — OP NOTE
[facility]  Opthalmology  Operative Note    SUMMARY     Date of Procedure: 11/14/2018     Procedure: Procedure(s) (LRB):  EXTRACTION, CATARACT, WITH IOL INSERTION (Left)       Surgeon(s) and Role:     * Marito Boyle II, MD - Primary      Pre-Operative Diagnosis: Cataract of left eye.    Post-Operative Diagnosis: Cataract of left eye.    Anesthesia: Topical    Procedure in Detail: The patient was brought from the Day Surgery Unit to the holding area where intravenous infusion was started and multiple doses of 4% lidocaine instilled into the left eye to achieve topical anesthesia.  The patient was then brought to the operating room where the operative field was prepped with Betadine and draped with sterile drapes.  The patient was then positioned under the operating microscope where a self retaining wire lid speculum was placed into the operative eye.  A partial thickness temporal clear corneal groove incision was then made near the limbus approximately 3mm in length using a 15 degree blade.  The same 15 degree blade was then used to create a limbal side port incision.  Additional intracameral anesthesia was obtained by the injection of 1% preservative free lidocaine through the side port incision.  The anterior chamber was then re-deepened by the injection of Viscoat through the side port incision.  The previously made temporal groove incision was then converted to a clear corneal valve incision using a 2.5 mm keratome.  A cystotome was then passed through this temporal clear corneal incision and used to raise an anterior lenticular capsular flap which was then grasped with Utrata forceps and a circular tear capsulotomy performed.  Hydrodissection of the lens nucleus from its surrounding cortical and capsular attachments was accomplished by the injection of Balanced Salt Solution through a 27 gauge flat hydrodissection cannula.   The phacoemulsification tip was then passed through the temporal  incision and used to sculpt the central nucleus and to sculpt a peripheral nuclear groove nasally.  A Knolle spatula was then passed through the side port incision and used to rotate the nucleus 90 degrees clockwise where a second peripheral nuclear groove was sculpted.  In like fashion a third and fourth groove were sculpted at 90 degrees intervals until the depths of the groove were sufficiently thin to allow fracture the nucleus into 4 fragments which were then individually emulsified in the pupillary plane.  The phacoemulsification tip was then withdrawn and the irrigation/aspiration cannula used to aspirate residual cortical material from the capsular bag.  The capsular bag was then reinflated and the anterior chamber redeepened by the injection of Provisc.  A posterior chamber intraocular lens was then placed into its insertion device and injected through the temporal clear corneal incision such that the lens was placed entirely inside the capsular bag.  The irrigation/aspiration cannula ws then used once again to aspirate residual Viscoelastic from the anterior chamber.  At the end of this procedure the anterior chamber was redeepened by the injection of additional Balanced Salt Solution through the side port incision.  The side port incision and temporal clear corneal incision were then tested with Weck-xochitl sponges and found to be watertight.  The eye was then irrigated with antibiotic solution after which the lid speculum and drapes were removed and an eye shield applied.  The patient tolerated the procedure well and was taken from the operating room in satisfactory condition.                  Estimated Blood Loss (EBL): * No values recorded between 11/14/2018  7:44 AM and 11/14/2018  7:57 AM *           Implants:   Implant Name Type Inv. Item Serial No.  Lot No. LRB No. Used   Neo acrysofIQ aspheric IOL Lens  [id number]   Left 1           Disposition: Home/Self Care

## 2018-11-14 NOTE — DISCHARGE INSTRUCTIONS
Anesthesia information    Anesthesia Safety      You have been given medicine  to sedate you during your procedure today. This may have included both a pain medicine and sleeping medicine. Most of the effects have worn off; however, you may continue to have some drowsiness for the next  24 hours. Anesthesia and pain medicines can cause nausea, sleepiness, dizziness and  constipation.    HOME CARE:  1) For the next EIGHT HOURS, you should be watched by a responsible adult to look for any worsening of your condition.  2) DO NOT DRINK any ALCOHOL for the next 24 HOURS.  3) DO NOT DRIVE or operate dangerous machinery during the next 24 HOURS.  FOLLOW UP with your doctor or this facility if you are not alert and back to your usual level of activity within 24 hrs.  GET PROMPT MEDICAL ATTENTION if any of the following occur:  -- Increased drowsiness  -- Increased weakness or dizziness  -- Repeated vomiting  -- If you cannot be awakened    After Cataract Surgery    You may remove your shield on the day of surgery. You may see double and your vision may be blurry when the patch is first removed. If you are uncomfortable with these symptoms, you may leave  the shield on    Tape the eye sheild over your eye at bedtime and naptime for 7 days. Tape is provided in your kit.    Do not vigorously press or rub your eye after surgery for 6 weeks.    Clean around your eyelids regularly being careful not to press on the upper lid.    You may shower, bathe and shampoo but avoid getting water, soap or shampoo in your eye for 2 weeks.  No swimming for 2 weeks.    Resume normal activities tomorrow, but no lifting or bending for 1 week.  Rest today. Reading, writing or watching TV are fine.     No eye makeup for 1 week, no eye creams for 24 hrs. Caution using perfumes or colognes.    You may be sensitive to light and  may wear sunglasses provided when outdoors     Your glasses may not be the right strength for your eye after surgery.  You  may wear your old glasses or go without.  You may find [employer] reading glasses helpful.    Symptoms you may experience the first day:  Blurry vision, Double Vision, Redish color to objects, Sensitivity to light, flickering light, Burning or scratchy feeling in eye.    Serious Problems that could arise that need immediate reporting to the Dr:     A sudden Decrease or loss of vision in either eye  Your vision is becoming worse instead of better each day  You develop a shadow in your vision  You see flashing lights  You have a continuous ache or increased pain not relieved by over the counter pain medication.    If you have any questions, please re read this carefully. If your  Question is still not answered call the office. [phone] . We are here to help you!    Emergency after office hours number is     Using eye drops:  Wash hands, shake drops well, tilt head back, open eye and look up, gently pull lower eyelid down to form a pocket, place drop into the pocket formed, close eye    Be careful not to touch your eyelashes or finger to the tip of the bottle. Allow 5 m between each drop.    Prednisolone 1 drop 4x daily- every 2 hrs til bedtime today  Polymyxin - Oxafloxacin 1 drop 4x daily  Ketorolac 1 drop 4 x daily

## 2018-11-15 VITALS
BODY MASS INDEX: 28.79 KG/M2 | RESPIRATION RATE: 20 BRPM | HEIGHT: 68 IN | SYSTOLIC BLOOD PRESSURE: 139 MMHG | OXYGEN SATURATION: 96 % | DIASTOLIC BLOOD PRESSURE: 75 MMHG | TEMPERATURE: 98 F | WEIGHT: 190 LBS | HEART RATE: 72 BPM

## 2018-11-30 LAB
ALBUMIN SERPL-MCNC: 3.9 G/DL (ref 3.1–4.7)
ALP SERPL-CCNC: 58 IU/L (ref 40–104)
ALT (SGPT): 52 IU/L (ref 3–33)
AST SERPL-CCNC: 30 IU/L (ref 10–40)
BASOPHILS NFR BLD: 0 K/UL (ref 0–0.2)
BASOPHILS NFR BLD: 0.5 %
BILIRUB SERPL-MCNC: 0.5 MG/DL (ref 0.3–1)
BUN SERPL-MCNC: 23 MG/DL (ref 8–20)
CALCIUM SERPL-MCNC: 9.3 MG/DL (ref 7.7–10.4)
CHLORIDE: 107 MMOL/L (ref 98–110)
CO2 SERPL-SCNC: 24.2 MMOL/L (ref 22.8–31.6)
CREATININE: 0.71 MG/DL (ref 0.6–1.4)
EOSINOPHIL NFR BLD: 0.1 K/UL (ref 0–0.7)
EOSINOPHIL NFR BLD: 2.5 %
ERYTHROCYTE [DISTWIDTH] IN BLOOD BY AUTOMATED COUNT: 13 % (ref 11.7–14.9)
GLUCOSE: 115 MG/DL (ref 70–99)
GRAN #: 2.6 K/UL (ref 1.4–6.5)
GRAN%: 45.3 %
HCT VFR BLD AUTO: 41.5 % (ref 36–48)
HGB BLD-MCNC: 13.4 G/DL (ref 12–15)
IMMATURE GRANS (ABS): 0 K/UL (ref 0–1)
IMMATURE GRANULOCYTES: 0.4 %
LYMPH #: 2.3 K/UL (ref 1.2–3.4)
LYMPH%: 41.4 %
MCH RBC QN AUTO: 31.9 PG (ref 25–35)
MCHC RBC AUTO-ENTMCNC: 32.3 G/DL (ref 31–36)
MCV RBC AUTO: 98.8 FL (ref 79–98)
MONO #: 0.6 K/UL (ref 0.1–0.6)
MONO%: 9.9 %
NUCLEATED RBCS: 0 %
PLATELET # BLD AUTO: 267 K/UL (ref 140–440)
PMV BLD AUTO: 9.2 FL (ref 8.8–12.7)
POTASSIUM SERPL-SCNC: 4.1 MMOL/L (ref 3.5–5)
PROT SERPL-MCNC: 6.9 G/DL (ref 6–8.2)
RBC # BLD AUTO: 4.2 M/UL (ref 3.5–5.5)
SODIUM: 140 MMOL/L (ref 134–144)
TSH SERPL DL<=0.005 MIU/L-ACNC: 1.48 ULU/ML (ref 0.3–5.6)
VITAMIN B12: 959 PG/ML (ref 62–940)
VITAMIN D, 1,25 (OH)2: 26.6 NG/ML (ref 30–100)
WBC # BLD AUTO: 5.6 K/UL (ref 5–10)

## 2018-12-04 ENCOUNTER — OFFICE VISIT (OUTPATIENT)
Dept: FAMILY MEDICINE | Facility: CLINIC | Age: 63
End: 2018-12-04
Payer: COMMERCIAL

## 2018-12-04 VITALS
WEIGHT: 189.25 LBS | TEMPERATURE: 98 F | OXYGEN SATURATION: 97 % | SYSTOLIC BLOOD PRESSURE: 128 MMHG | RESPIRATION RATE: 18 BRPM | BODY MASS INDEX: 28.68 KG/M2 | HEART RATE: 80 BPM | DIASTOLIC BLOOD PRESSURE: 82 MMHG | HEIGHT: 68 IN

## 2018-12-04 DIAGNOSIS — E55.9 VITAMIN D DEFICIENCY: ICD-10-CM

## 2018-12-04 DIAGNOSIS — I10 BENIGN ESSENTIAL HYPERTENSION: ICD-10-CM

## 2018-12-04 DIAGNOSIS — R89.9 ABNORMAL LABORATORY TEST: Primary | ICD-10-CM

## 2018-12-04 DIAGNOSIS — G62.9 NEUROPATHY: ICD-10-CM

## 2018-12-04 DIAGNOSIS — G47.33 OSA (OBSTRUCTIVE SLEEP APNEA): ICD-10-CM

## 2018-12-04 DIAGNOSIS — E78.2 MIXED HYPERLIPIDEMIA: ICD-10-CM

## 2018-12-04 LAB — HBA1C MFR BLD: 5.7 %

## 2018-12-04 PROCEDURE — 99213 OFFICE O/P EST LOW 20 MIN: CPT | Mod: ,,, | Performed by: INTERNAL MEDICINE

## 2018-12-04 PROCEDURE — 83036 HEMOGLOBIN GLYCOSYLATED A1C: CPT | Mod: QW,,, | Performed by: INTERNAL MEDICINE

## 2018-12-04 RX ORDER — SPIRONOLACTONE 25 MG/1
25 TABLET ORAL DAILY
Qty: 90 TABLET | Refills: 3 | Status: SHIPPED | OUTPATIENT
Start: 2018-12-04 | End: 2020-11-01

## 2018-12-04 RX ORDER — LOSARTAN POTASSIUM 100 MG/1
100 TABLET ORAL DAILY
Qty: 90 TABLET | Refills: 1 | Status: SHIPPED | OUTPATIENT
Start: 2018-12-04 | End: 2020-12-13

## 2018-12-04 NOTE — ASSESSMENT & PLAN NOTE
Trial of lyrica 150mg PM and 75mg AM. Per pt started after herniated lumbar disc with acute radiculopathy years ago.  Normal TSH, CBC, B12, ESR. HbA1c 5.7%, pt counseled to watch diet. Discussed neuro referral for EMG/NCS, pt declined.

## 2018-12-04 NOTE — PROGRESS NOTES
ADULT FOLLOW-UP VISIT    Subjective:     Chief Complaint:  Follow-up; Hypertension; and Abnormal Lab      64 yo woman here for routine follow-up. Reviewed labs results- LDL and TGs elevated, fasting glucose 115, HbA1c 5.7%.  Pt planning to start back to exercising and changes to diet. Lyrica working for neuropathy though pt still has symptoms.  Pt reports sxs worse at night.          Ms. Nettles  has a past medical history of Cataract, Hyperlipidemia, and Hypertension.    Family history and social history are reviewed and there are no significant changes.     ROS:  Review of Systems   Respiratory: Negative for shortness of breath and wheezing.    Cardiovascular: Negative for chest pain and palpitations.   Musculoskeletal: Positive for arthralgias. Negative for back pain, joint swelling, myalgias and neck pain.   Neurological: Positive for numbness. Negative for dizziness, tremors, seizures, weakness and light-headedness.          Current Outpatient Medications:     clonazePAM (KLONOPIN) 1 MG tablet, Take 1 mg by mouth nightly as needed for Insomnia., Disp: , Rfl: 5    DUREZOL 0.05 % Drop ophthalmic solution, INSTILL 1 DROP IN THE LEFT EYE TWICE A DAY, Disp: , Rfl: 1    ketorolac 0.5% (ACULAR) 0.5 % Drop, 1 DROP IN THE LEFT EYE 4 X A DAY XWK. THEN 3X A DAY 1WK. 2X A DAY X1WK THEN 1X A DAY X1WK, Disp: , Rfl: 1    losartan (COZAAR) 100 MG tablet, Take 1 tablet (100 mg total) by mouth once daily., Disp: 90 tablet, Rfl: 1    LYRICA 75 mg capsule, TAKE 1 CAPSULE BY MOUTH TWICE A DAY, Disp: 60 capsule, Rfl: 2    multivit-minerals/ferrous fum (MULTI VITAMIN ORAL), Multi Vitamin   1qd, Disp: , Rfl:     spironolactone (ALDACTONE) 25 MG tablet, Take 1 tablet (25 mg total) by mouth once daily., Disp: 90 tablet, Rfl: 3    fluticasone (FLONASE) 50 mcg/actuation nasal spray, 1 spray (50 mcg total) by Each Nare route once daily., Disp: 1 Bottle, Rfl: 11      Objective:     Physical  "Examination:     /82   Pulse 80   Temp 97.6 °F (36.4 °C) (Oral)   Resp 18   Ht 5' 8" (1.727 m)   Wt 85.8 kg (189 lb 4 oz)   SpO2 97%   BMI 28.78 kg/m²     Physical Exam   Constitutional: She appears well-developed and well-nourished. No distress.   HENT:   Head: Normocephalic and atraumatic.   Nose: Nose normal.   Mouth/Throat: Oropharynx is clear and moist and mucous membranes are normal.   Eyes: Conjunctivae are normal. Pupils are equal, round, and reactive to light.   Cardiovascular: Normal rate, regular rhythm, normal heart sounds and intact distal pulses.   No murmur heard.  Pulmonary/Chest: Effort normal and breath sounds normal. She has no wheezes. She has no rales.   Musculoskeletal: She exhibits no edema.   Neurological: She has normal strength. She displays no atrophy and no tremor. A sensory deficit (mild sensory loss B feet, worse on L) is present.   Skin: She is not diaphoretic.       Assessment/Plan:   Mavis is a 63 y.o. female here for follow-up.    Problem List Items Addressed This Visit        Neuro    Neuropathy    Current Assessment & Plan     Trial of lyrica 150mg PM and 75mg AM. Per pt started after herniated lumbar disc with acute radiculopathy years ago.  Normal TSH, CBC, B12, ESR. HbA1c 5.7%, pt counseled to watch diet. Discussed neuro referral for EMG/NCS, pt declined.             Cardiac/Vascular    Benign essential hypertension    Current Assessment & Plan     Well controlled on cozaar and aldactone. CMP normal 11/2018.         Relevant Medications    losartan (COZAAR) 100 MG tablet    spironolactone (ALDACTONE) 25 MG tablet    Other Relevant Orders    Comprehensive metabolic panel    Mixed hyperlipidemia    Current Assessment & Plan     Consider statin if not improving in 6 months after diet changes.          Relevant Orders    Lipid panel       Endocrine    Vitamin D deficiency    Current Assessment & Plan     Restart vitamin D 1000 - 2000 IU daily.          Relevant Orders "    Vitamin D       Other    AP (obstructive sleep apnea)    Current Assessment & Plan     Sees Dr. Coles, on CPAP.            Other Visit Diagnoses     Abnormal laboratory test    -  Primary    Relevant Orders    Hemoglobin A1C, POCT (Completed)          Health Maintenance   Topic Date Due    Zoster Vaccine  05/11/2017    Influenza Vaccine  08/01/2018    Mammogram  06/20/2020    Lipid Panel  11/30/2023    TETANUS VACCINE  06/06/2028    Colonoscopy  08/20/2028    Hepatitis C Screening  Completed    Pneumococcal Vaccine (Medium Risk)  Discontinued           Discussion:     Follow-up in about 6 months (around 6/4/2019) for f/u lipids.    Goals     None          Electronically signed by Sofie Higgins

## 2018-12-30 DIAGNOSIS — G62.9 NEUROPATHY: ICD-10-CM

## 2019-01-04 ENCOUNTER — TELEPHONE (OUTPATIENT)
Dept: PEDIATRICS | Facility: CLINIC | Age: 64
End: 2019-01-04

## 2019-01-04 RX ORDER — PREGABALIN 75 MG/1
CAPSULE ORAL
Qty: 60 CAPSULE | Refills: 2 | Status: SHIPPED | OUTPATIENT
Start: 2019-01-04 | End: 2020-07-01

## 2019-01-04 NOTE — TELEPHONE ENCOUNTER
75mg refill, pain did not get any better with the increase to 150 so she wishes to stay on the 75 BID

## 2019-01-04 NOTE — TELEPHONE ENCOUNTER
Patient has had severe congestion, headache and sinus pain for a week so much that it is making her top teeth hurt requesting antibiotic.

## 2019-01-07 NOTE — TELEPHONE ENCOUNTER
Spoke with patient feeling a little better on mucinex relieved some pressure.Still congested. Will call us back if she worsens.

## 2019-08-12 ENCOUNTER — LAB VISIT (OUTPATIENT)
Dept: LAB | Facility: HOSPITAL | Age: 64
End: 2019-08-12
Attending: INTERNAL MEDICINE
Payer: COMMERCIAL

## 2019-08-12 DIAGNOSIS — E78.5 HYPERLIPEMIA: Primary | ICD-10-CM

## 2019-08-12 LAB
ALBUMIN SERPL BCP-MCNC: 4 G/DL (ref 3.5–5.2)
ALP SERPL-CCNC: 52 U/L (ref 55–135)
ALT SERPL W/O P-5'-P-CCNC: 57 U/L (ref 10–44)
AST SERPL-CCNC: 42 U/L (ref 10–40)
BILIRUB DIRECT SERPL-MCNC: <0.1 MG/DL (ref 0.1–0.3)
BILIRUB SERPL-MCNC: 0.5 MG/DL (ref 0.1–1)
CHOLEST SERPL-MCNC: 203 MG/DL (ref 120–199)
CHOLEST/HDLC SERPL: 4.4 {RATIO} (ref 2–5)
HDLC SERPL-MCNC: 46 MG/DL (ref 40–75)
HDLC SERPL: 22.7 % (ref 20–50)
LDLC SERPL CALC-MCNC: 120.2 MG/DL (ref 63–159)
NONHDLC SERPL-MCNC: 157 MG/DL
PROT SERPL-MCNC: 7.1 G/DL (ref 6–8.4)
TRIGL SERPL-MCNC: 184 MG/DL (ref 30–150)

## 2019-08-12 PROCEDURE — 80076 HEPATIC FUNCTION PANEL: CPT

## 2019-08-12 PROCEDURE — 80061 LIPID PANEL: CPT

## 2019-08-12 PROCEDURE — 36415 COLL VENOUS BLD VENIPUNCTURE: CPT

## 2019-09-26 DIAGNOSIS — R19.7 DIARRHEA OF PRESUMED INFECTIOUS ORIGIN: Primary | ICD-10-CM

## 2019-09-27 ENCOUNTER — LAB VISIT (OUTPATIENT)
Dept: LAB | Facility: HOSPITAL | Age: 64
End: 2019-09-27
Attending: INTERNAL MEDICINE
Payer: COMMERCIAL

## 2019-09-27 DIAGNOSIS — R19.7 DIARRHEA OF PRESUMED INFECTIOUS ORIGIN: Primary | ICD-10-CM

## 2019-09-27 PROCEDURE — 87329 GIARDIA AG IA: CPT

## 2019-09-27 PROCEDURE — 87328 CRYPTOSPORIDIUM AG IA: CPT

## 2019-09-27 PROCEDURE — 87045 FECES CULTURE AEROBIC BACT: CPT

## 2019-09-27 PROCEDURE — 87015 SPECIMEN INFECT AGNT CONCNTJ: CPT

## 2019-09-27 PROCEDURE — 87209 SMEAR COMPLEX STAIN: CPT

## 2019-09-27 PROCEDURE — 87046 STOOL CULTR AEROBIC BACT EA: CPT

## 2019-09-29 LAB
BACTERIA STL CULT: NORMAL
BACTERIA STL CULT: NORMAL

## 2019-09-30 LAB
GIARDIA ANTIGEN - EIA: NEGATIVE
GIARDIA LAMBLIA AG SOURCE: NORMAL

## 2019-10-02 ENCOUNTER — TELEPHONE (OUTPATIENT)
Dept: SURGERY | Facility: CLINIC | Age: 64
End: 2019-10-02

## 2019-10-02 NOTE — TELEPHONE ENCOUNTER
----- Message from Angie Ruff sent at 10/2/2019  2:29 PM CDT -----  Contact: patient   Pt need to speak with a nurse has a question regarding surgery. Please call back at   [phone] or [phone].

## 2019-10-03 LAB — O+P STL TRI STN: NORMAL

## 2019-10-05 LAB — CRYPTOSP AG STL QL IA: NEGATIVE

## 2019-10-10 ENCOUNTER — TELEPHONE (OUTPATIENT)
Dept: SURGERY | Facility: CLINIC | Age: 64
End: 2019-10-10

## 2019-10-10 NOTE — TELEPHONE ENCOUNTER
I called patient to reschedule her time from 3:30pm to 2:30pm on Tuesday, 10/29/19 in [place].  Jarad

## 2019-10-29 ENCOUNTER — OFFICE VISIT (OUTPATIENT)
Dept: SURGERY | Facility: CLINIC | Age: 64
End: 2019-10-29
Payer: COMMERCIAL

## 2019-10-29 ENCOUNTER — TELEPHONE (OUTPATIENT)
Dept: SURGERY | Facility: CLINIC | Age: 64
End: 2019-10-29

## 2019-10-29 VITALS
HEIGHT: 68 IN | DIASTOLIC BLOOD PRESSURE: 85 MMHG | TEMPERATURE: 98 F | SYSTOLIC BLOOD PRESSURE: 148 MMHG | WEIGHT: 185.63 LBS | HEART RATE: 101 BPM | BODY MASS INDEX: 28.13 KG/M2

## 2019-10-29 DIAGNOSIS — K57.92 DIVERTICULITIS: Primary | ICD-10-CM

## 2019-10-29 PROCEDURE — 99999 PR PBB SHADOW E&M-EST. PATIENT-LVL III: ICD-10-PCS | Mod: PBBFAC,,, | Performed by: SURGERY

## 2019-10-29 PROCEDURE — 99244 PR OFFICE CONSULTATION,LEVEL IV: ICD-10-PCS | Mod: S$GLB,,, | Performed by: SURGERY

## 2019-10-29 PROCEDURE — 99244 OFF/OP CNSLTJ NEW/EST MOD 40: CPT | Mod: S$GLB,,, | Performed by: SURGERY

## 2019-10-29 PROCEDURE — 99999 PR PBB SHADOW E&M-EST. PATIENT-LVL III: CPT | Mod: PBBFAC,,, | Performed by: SURGERY

## 2019-10-29 NOTE — TELEPHONE ENCOUNTER
----- Message from Brielle Langford sent at 10/29/2019  3:07 PM CDT -----  Type: Needs Medical Advice    Who Called:  Patient -Mavis     Best Call Back Number:  [phone]   Additional Information: patient was in the office today for 230 and left her umbrella there   ( has flowers on it ) and its in the room she was sitting in between the chairs. Can you please put it behind the nurses desk to ensure no one takes it  Also please contact her directly if you find it

## 2019-10-29 NOTE — LETTER
October 30, 2019      Kane Boyd MD  [address]  [place] [address]           [facility] - General Surgery  [address] [facility] [address]  [facility] [address]  Phone: [phone]          Patient: Mavis Nettles   MR Number: [id number]   YOB: 1955   Date of Visit: 10/29/2019       Dear Dr. Kane Boyd:    Thank you for referring Mavis Nettles to me for evaluation. Attached you will find relevant portions of my assessment and plan of care.    If you have questions, please do not hesitate to call me. I look forward to following Mavis Nettles along with you.    Sincerely,    Eugenio Manzo MD    Enclosure  CC:  No Recipients    If you would like to receive this communication electronically, please contact [email or web address] or [phone] to request more information on [employer] access.    For providers and/or their staff who would like to refer a patient to [facility], please contact us through our one-stop-shop provider referral line, [facility], at [phone].    If you feel you have received this communication in error or would no longer like to receive these types of communications, please e-mail [email or web address]

## 2019-10-29 NOTE — Clinical Note
I saw your patient, Mavis Nettles in the office.  Attached are my findings and plan.  Thank you for referring her to my office and if you have any questions please do not hesitate to call my cell [phone].Ramez Manzo

## 2019-10-29 NOTE — TELEPHONE ENCOUNTER
I called patient and I found her umbrella.  I informed her that I'll put it at the  in [place]. [address].  Jarad

## 2019-10-30 NOTE — PROGRESS NOTES
Subjective:       Patient ID: Mavis Nettles is a 64 y.o. female.    Chief Complaint: Consult (Needs colectomy)    HPI  Pleasant 63 yo F referred to me in consultation from Dr Boyd for evaluation of colectomy.  Pt notes that she has had diverticulitis for years.  She notes that she frequently has periods of abdominal pain typically in L lower abdomen.  Notes that when thes events occur she stays npo for several days and will occasionally take abx.  Pt has never been hospitalized for diverticulitis.  She reports having had only one ct scan demonstrating diverticulitis this was last year at [facility].  Pt notes that when symptoms occur she does experience bloating, notes changes between constipation and diarrhea related to her BMs.  Pt has PMHx significant for HTN.  Her PSHx is significant for cholecstectomy and hysterectomy.  She did have a colonoscopy last year demonstrating diverticulosis.   Review of Systems   Constitutional: Negative for activity change, appetite change, fever and unexpected weight change.   HENT: Negative for congestion and facial swelling.    Respiratory: Negative for chest tightness, shortness of breath and wheezing.    Cardiovascular: Negative for chest pain.   Gastrointestinal: Negative for abdominal distention, abdominal pain, anal bleeding, blood in stool, constipation, diarrhea, nausea, rectal pain and vomiting.   Genitourinary: Negative for difficulty urinating, dysuria and frequency.   Skin: Negative for wound.   Neurological: Negative for dizziness.   Hematological: Negative for adenopathy.   Psychiatric/Behavioral: Negative for agitation.       Objective:      Physical Exam   Constitutional: She is oriented to person, place, and time. She appears well-developed and well-nourished.   HENT:   Head: Normocephalic and atraumatic.   Eyes: Pupils are equal, round, and reactive to light.   Neck: Normal range of motion. Neck supple. No tracheal deviation present. No thyromegaly present.    Cardiovascular: Normal rate, regular rhythm and normal heart sounds.   No murmur heard.  Pulmonary/Chest: Effort normal and breath sounds normal. She exhibits no tenderness.   Abdominal: Soft. Bowel sounds are normal. She exhibits no distension, no abdominal bruit, no pulsatile midline mass and no mass. There is no hepatosplenomegaly. There is no tenderness. There is no rigidity, no rebound, no guarding, no tenderness at McBurney's point and negative Beasley's sign. No hernia. Hernia confirmed negative in the ventral area.   Genitourinary: Rectum normal.   Musculoskeletal: Normal range of motion.   Neurological: She is alert and oriented to person, place, and time.   Skin: Skin is warm. No rash noted. No erythema.   Psychiatric: She has a normal mood and affect.   Vitals reviewed.          Assessment:     Diverticulitis  No diagnosis found.    Plan:       D/w pt.  I informed her that I need to see report from [facility].  Will obtain these records.  D/w pt the multiple pros and cons of surgery.  D/w her that if she truly is having bouts of diverticulitis every 1-2 months then I would be more prone to recommend surgery.  D/w her that some of her symptoms are not typical of diverticulitis and may be related to other sources which would not be resolved with colectomy.  Also lengthy d/w pt regarding risks of surgery.  WIll obtain old imaging reports.  Pt will RTC should she develop pain.  Will call to review

## 2019-11-11 ENCOUNTER — TELEPHONE (OUTPATIENT)
Dept: SURGERY | Facility: CLINIC | Age: 64
End: 2019-11-11

## 2019-11-11 NOTE — TELEPHONE ENCOUNTER
----- Message from Christina Lugo sent at 11/11/2019  9:32 AM CST -----  Contact: Mavis pt  Type: Needs Medical Advice    Who Called:  Mavis  Symptoms (please be specific):  Pt is having a flare up  How long has patient had these symptoms:  This weekend  Pharmacy name and phone #:    [id number] - [place], [place] - [address] AT [facility]  [address]  [place] [address]  Phone: [phone] Fax: [phone]      Best Call Back Number: work [phone] or cell [phone]  Additional Information: Pls call pt regarding scheduling a CT scan since she is having a flare up

## 2019-11-12 DIAGNOSIS — K57.92 DIVERTICULITIS: Primary | ICD-10-CM

## 2019-11-12 NOTE — TELEPHONE ENCOUNTER
Patient informed that the order is in [facility] and she wants to have the CT Scan done at [facility].  Patient states that she'll call to schedule when she has a flare up.  She's feeling better today.  Jarad

## 2019-11-12 NOTE — TELEPHONE ENCOUNTER
----- Message from Tamika Bazzi sent at 11/12/2019 12:41 PM CST -----  Contact: SUGEY [phone]  W/ [phone]   Patient is requesting to speak with you regarding her CT scan. Please advise.

## 2019-12-30 ENCOUNTER — HOSPITAL ENCOUNTER (OUTPATIENT)
Dept: RADIOLOGY | Facility: HOSPITAL | Age: 64
Discharge: HOME | End: 2019-12-30
Attending: INTERNAL MEDICINE
Payer: COMMERCIAL

## 2019-12-30 DIAGNOSIS — K57.32 DIVERTICULITIS OF LARGE INTESTINE WITHOUT PERFORATION OR ABSCESS WITHOUT BLEEDING: Primary | ICD-10-CM

## 2019-12-30 DIAGNOSIS — K57.32 DIVERTICULITIS OF LARGE INTESTINE WITHOUT PERFORATION OR ABSCESS WITHOUT BLEEDING: ICD-10-CM

## 2019-12-30 LAB
CREAT SERPL-MCNC: 0.8 MG/DL (ref 0.5–1.4)
SAMPLE: NORMAL

## 2019-12-30 PROCEDURE — 25500020 PHARM REV CODE 255: Mod: PO | Performed by: INTERNAL MEDICINE

## 2019-12-30 PROCEDURE — 74177 CT ABD & PELVIS W/CONTRAST: CPT | Mod: TC,PO

## 2019-12-30 RX ADMIN — IOHEXOL 100 ML: 350 INJECTION, SOLUTION INTRAVENOUS at 12:12

## 2020-04-23 DIAGNOSIS — Z01.84 ANTIBODY RESPONSE EXAMINATION: ICD-10-CM

## 2020-04-27 ENCOUNTER — LAB VISIT (OUTPATIENT)
Dept: LAB | Facility: HOSPITAL | Age: 65
End: 2020-04-27
Attending: INTERNAL MEDICINE
Payer: COMMERCIAL

## 2020-04-27 DIAGNOSIS — Z01.84 ANTIBODY RESPONSE EXAMINATION: ICD-10-CM

## 2020-04-27 PROCEDURE — 36415 COLL VENOUS BLD VENIPUNCTURE: CPT

## 2020-04-27 PROCEDURE — 86769 SARS-COV-2 COVID-19 ANTIBODY: CPT

## 2020-04-28 LAB — SARS-COV-2 IGG SERPL QL IA: NEGATIVE

## 2020-07-01 ENCOUNTER — OFFICE VISIT (OUTPATIENT)
Dept: FAMILY MEDICINE | Facility: CLINIC | Age: 65
End: 2020-07-01
Payer: COMMERCIAL

## 2020-07-01 VITALS
SYSTOLIC BLOOD PRESSURE: 136 MMHG | BODY MASS INDEX: 29.27 KG/M2 | DIASTOLIC BLOOD PRESSURE: 84 MMHG | HEART RATE: 84 BPM | HEIGHT: 68 IN | WEIGHT: 193.13 LBS | RESPIRATION RATE: 18 BRPM | OXYGEN SATURATION: 97 %

## 2020-07-01 DIAGNOSIS — G47.33 OSA (OBSTRUCTIVE SLEEP APNEA): ICD-10-CM

## 2020-07-01 DIAGNOSIS — E55.9 VITAMIN D DEFICIENCY: ICD-10-CM

## 2020-07-01 DIAGNOSIS — J34.89 NASAL SORE: ICD-10-CM

## 2020-07-01 DIAGNOSIS — R73.01 ELEVATED FASTING GLUCOSE: ICD-10-CM

## 2020-07-01 DIAGNOSIS — J32.9 CHRONIC SINUSITIS, UNSPECIFIED LOCATION: ICD-10-CM

## 2020-07-01 DIAGNOSIS — J30.89 ALLERGIC RHINITIS DUE TO OTHER ALLERGIC TRIGGER, UNSPECIFIED SEASONALITY: ICD-10-CM

## 2020-07-01 DIAGNOSIS — Z12.31 SCREENING MAMMOGRAM, ENCOUNTER FOR: ICD-10-CM

## 2020-07-01 DIAGNOSIS — I10 BENIGN ESSENTIAL HYPERTENSION: Primary | ICD-10-CM

## 2020-07-01 DIAGNOSIS — E78.2 MIXED HYPERLIPIDEMIA: ICD-10-CM

## 2020-07-01 PROBLEM — J30.9 ALLERGIC RHINITIS: Status: ACTIVE | Noted: 2020-07-01

## 2020-07-01 PROBLEM — H26.9 CATARACT, LEFT: Status: RESOLVED | Noted: 2018-11-14 | Resolved: 2020-07-01

## 2020-07-01 PROCEDURE — 3079F PR MOST RECENT DIASTOLIC BLOOD PRESSURE 80-89 MM HG: ICD-10-PCS | Mod: S$GLB,,, | Performed by: INTERNAL MEDICINE

## 2020-07-01 PROCEDURE — 3008F PR BODY MASS INDEX (BMI) DOCUMENTED: ICD-10-PCS | Mod: S$GLB,,, | Performed by: INTERNAL MEDICINE

## 2020-07-01 PROCEDURE — 3075F SYST BP GE 130 - 139MM HG: CPT | Mod: S$GLB,,, | Performed by: INTERNAL MEDICINE

## 2020-07-01 PROCEDURE — 99214 PR OFFICE/OUTPT VISIT, EST, LEVL IV, 30-39 MIN: ICD-10-PCS | Mod: S$GLB,,, | Performed by: INTERNAL MEDICINE

## 2020-07-01 PROCEDURE — 3079F DIAST BP 80-89 MM HG: CPT | Mod: S$GLB,,, | Performed by: INTERNAL MEDICINE

## 2020-07-01 PROCEDURE — 3075F PR MOST RECENT SYSTOLIC BLOOD PRESS GE 130-139MM HG: ICD-10-PCS | Mod: S$GLB,,, | Performed by: INTERNAL MEDICINE

## 2020-07-01 PROCEDURE — 99214 OFFICE O/P EST MOD 30 MIN: CPT | Mod: S$GLB,,, | Performed by: INTERNAL MEDICINE

## 2020-07-01 PROCEDURE — 3008F BODY MASS INDEX DOCD: CPT | Mod: S$GLB,,, | Performed by: INTERNAL MEDICINE

## 2020-07-01 RX ORDER — LEVOCETIRIZINE DIHYDROCHLORIDE 5 MG/1
5 TABLET, FILM COATED ORAL NIGHTLY
Qty: 30 TABLET | Refills: 11 | Status: SHIPPED | OUTPATIENT
Start: 2020-07-01 | End: 2021-03-17

## 2020-07-01 RX ORDER — MUPIROCIN 20 MG/G
OINTMENT TOPICAL 3 TIMES DAILY
Qty: 1 TUBE | Refills: 1 | Status: SHIPPED | OUTPATIENT
Start: 2020-07-01

## 2020-07-01 RX ORDER — OMEGA-3-ACID ETHYL ESTERS 1 G/1
CAPSULE, LIQUID FILLED ORAL
COMMUNITY
Start: 2020-06-08 | End: 2020-07-01 | Stop reason: SDUPTHER

## 2020-07-01 RX ORDER — OMEGA-3-ACID ETHYL ESTERS 1 G/1
CAPSULE, LIQUID FILLED ORAL
COMMUNITY
Start: 2019-11-04 | End: 2020-11-01 | Stop reason: SDUPTHER

## 2020-07-01 NOTE — PROGRESS NOTES
ADULT FOLLOW-UP VISIT    Subjective:     Chief Complaint:  Annual Exam      65-year-old woman history of AP, hypertension, hyperlipidemia, chronic sinusitis here for routine follow-up.  Patient was last seen in 2018.  States she has been continuing on her antihypertensive medications of Aldactone and losartan.  Her main complaint today is ongoing nasal congestion, postnasal drip, hoarse voice.  She states that she saw Allergy many years ago in was noted to have multiple allergies requiring allergy shots.  She states her allergies were improved for a number of years but have been worsening over the past few years.  She has previously been on Claritin and Zyrtec, Flonase, nasal saline rinses but she has not been compliant with any of these on a regular basis because she does not feel like they work that well.  She saw ENT within the past year due to the hoarse voice.  She had already been on Flonase for some time so he put her on a PPI to treat any laryngeal pharyngeal reflux.  She states she did not have any improvement on the PPI.  She complains of constant nasal congestion, sinus pressure, occasional sinus headaches.        Ms. Nettles  has a past medical history of Cataract, Hyperlipidemia, and Hypertension.    Family history and social history are reviewed and there are no significant changes.     ROS:  Review of Systems   Constitutional: Negative for activity change, appetite change, fatigue and unexpected weight change.   HENT: Positive for congestion, postnasal drip, sinus pressure and sinus pain. Negative for ear pain, rhinorrhea, sore throat and trouble swallowing.    Eyes: Negative for pain, redness and visual disturbance.   Respiratory: Negative for cough, chest tightness, shortness of breath and wheezing.    Cardiovascular: Negative for chest pain and palpitations.   Gastrointestinal: Negative for abdominal pain, constipation, diarrhea, nausea and vomiting.   Endocrine:  "Negative for cold intolerance, heat intolerance, polydipsia and polyuria.   Genitourinary: Negative for difficulty urinating, dysuria, flank pain, frequency, pelvic pain, vaginal bleeding and vaginal discharge.   Musculoskeletal: Negative for arthralgias and joint swelling.   Skin: Negative for rash.   Allergic/Immunologic: Positive for environmental allergies. Negative for food allergies.   Neurological: Negative for dizziness, seizures, syncope, weakness and headaches.   Hematological: Negative for adenopathy.   Psychiatric/Behavioral: Negative for confusion, dysphoric mood and suicidal ideas. The patient is not nervous/anxious.           Current Outpatient Medications:     clonazePAM (KLONOPIN) 1 MG tablet, Take 1 mg by mouth nightly as needed for Insomnia., Disp: , Rfl: 5    losartan (COZAAR) 100 MG tablet, Take 1 tablet (100 mg total) by mouth once daily., Disp: 90 tablet, Rfl: 1    omega-3 acid ethyl esters (LOVAZA) 1 gram capsule, , Disp: , Rfl:     spironolactone (ALDACTONE) 25 MG tablet, Take 1 tablet (25 mg total) by mouth once daily., Disp: 90 tablet, Rfl: 3    levocetirizine (XYZAL) 5 MG tablet, Take 1 tablet (5 mg total) by mouth every evening., Disp: 30 tablet, Rfl: 11    mupirocin (BACTROBAN) 2 % ointment, Apply topically 3 (three) times daily., Disp: 1 Tube, Rfl: 1      Objective:     Physical Examination:     /84   Pulse 84   Resp 18   Ht 5' 8" (1.727 m)   Wt 87.6 kg (193 lb 2 oz)   SpO2 97%   BMI 29.36 kg/m²     Physical Exam  Constitutional:       General: She is not in acute distress.     Appearance: She is well-developed. She is not diaphoretic.   HENT:      Right Ear: Tympanic membrane and external ear normal.      Left Ear: Tympanic membrane and external ear normal.      Nose: Mucosal edema present.      Right Sinus: No maxillary sinus tenderness or frontal sinus tenderness.      Left Sinus: No maxillary sinus tenderness or frontal sinus tenderness.      Mouth/Throat:      " Pharynx: No oropharyngeal exudate or posterior oropharyngeal erythema.   Eyes:      General:         Right eye: No discharge.         Left eye: No discharge.      Conjunctiva/sclera: Conjunctivae normal.      Pupils: Pupils are equal, round, and reactive to light.   Cardiovascular:      Rate and Rhythm: Normal rate and regular rhythm.      Heart sounds: Normal heart sounds. No murmur.   Pulmonary:      Effort: Pulmonary effort is normal. No respiratory distress.      Breath sounds: Normal breath sounds. No wheezing or rales.   Lymphadenopathy:      Cervical: No cervical adenopathy.   Neurological:      Mental Status: She is alert and oriented to person, place, and time.         Assessment/Plan:   Mavis is a 65 y.o. female here for follow-up.    Problem List Items Addressed This Visit        ENT    Chronic sinusitis    Current Assessment & Plan     CT sinuses ordered to evaluate for anatomic abnormality or chronic sinus infection.          Relevant Medications    levocetirizine (XYZAL) 5 MG tablet    Other Relevant Orders    CT Sinuses without Contrast       Cardiac/Vascular    Benign essential hypertension - Primary    Current Assessment & Plan     Well controlled on cozaar and aldactone. CMP ordered.          Relevant Orders    Comprehensive metabolic panel    Mixed hyperlipidemia    Current Assessment & Plan     On fish oil.  Check lipids.          Relevant Orders    Lipid Panel    TSH    Comprehensive metabolic panel       Endocrine    Vitamin D deficiency    Relevant Orders    Vitamin D    Elevated fasting glucose    Current Assessment & Plan     A1c 5.7% 2018. Recheck.          Relevant Orders    Hemoglobin A1C       Other    AP (obstructive sleep apnea)    Current Assessment & Plan     On CPAP.          Allergic rhinitis    Current Assessment & Plan     Advised b.i.d. nasal saline rinses followed by Flonase.  Trial of Xyzal as patient has previously failed Claritin and Zyrtec.  Will obtain the CT of the  sinuses as noted above, if negative will consider referral to allergy.         Relevant Medications    levocetirizine (XYZAL) 5 MG tablet      Other Visit Diagnoses     Nasal sore        Relevant Medications    mupirocin (BACTROBAN) 2 % ointment    Screening mammogram, encounter for        Relevant Orders    Mammo Digital Screening Bilat without CA          Health Maintenance   Topic Date Due    Mammogram  06/20/2020    Lipid Panel  08/12/2024    TETANUS VACCINE  06/06/2028    Hepatitis C Screening  Completed    Pneumococcal Vaccine (Medium Risk)  Discontinued           Discussion:     No follow-ups on file.    Goals    None         Electronically signed by Sofie Higgins

## 2020-07-02 NOTE — ASSESSMENT & PLAN NOTE
Advised b.i.d. nasal saline rinses followed by Flonase.  Trial of Xyzal as patient has previously failed Claritin and Zyrtec.  Will obtain the CT of the sinuses as noted above, if negative will consider referral to allergy.

## 2020-07-13 ENCOUNTER — TELEPHONE (OUTPATIENT)
Dept: FAMILY MEDICINE | Facility: CLINIC | Age: 65
End: 2020-07-13

## 2020-07-13 DIAGNOSIS — M79.671 BILATERAL FOOT PAIN: Primary | ICD-10-CM

## 2020-07-13 DIAGNOSIS — M79.672 BILATERAL FOOT PAIN: Primary | ICD-10-CM

## 2020-07-13 NOTE — TELEPHONE ENCOUNTER
Pt having imaging done dariusz morning and is asking if you would add xrays of her feet. Says she has been having neuropathy and wants to know if it could be from something else

## 2020-07-14 ENCOUNTER — HOSPITAL ENCOUNTER (OUTPATIENT)
Dept: RADIOLOGY | Facility: HOSPITAL | Age: 65
Discharge: HOME | End: 2020-07-14
Attending: INTERNAL MEDICINE
Payer: COMMERCIAL

## 2020-07-14 ENCOUNTER — LAB VISIT (OUTPATIENT)
Dept: LAB | Facility: HOSPITAL | Age: 65
End: 2020-07-14
Attending: INTERNAL MEDICINE
Payer: COMMERCIAL

## 2020-07-14 DIAGNOSIS — M79.672 BILATERAL FOOT PAIN: ICD-10-CM

## 2020-07-14 DIAGNOSIS — I10 ESSENTIAL HYPERTENSION, MALIGNANT: ICD-10-CM

## 2020-07-14 DIAGNOSIS — M79.671 BILATERAL FOOT PAIN: ICD-10-CM

## 2020-07-14 DIAGNOSIS — R73.01 IMPAIRED FASTING GLUCOSE: Primary | ICD-10-CM

## 2020-07-14 DIAGNOSIS — E78.2 MIXED HYPERLIPIDEMIA: ICD-10-CM

## 2020-07-14 DIAGNOSIS — E55.9 AVITAMINOSIS D: ICD-10-CM

## 2020-07-14 DIAGNOSIS — J32.9 CHRONIC SINUSITIS, UNSPECIFIED LOCATION: ICD-10-CM

## 2020-07-14 DIAGNOSIS — Z12.31 SCREENING MAMMOGRAM, ENCOUNTER FOR: ICD-10-CM

## 2020-07-14 LAB
25(OH)D3+25(OH)D2 SERPL-MCNC: 29 NG/ML (ref 30–96)
ALBUMIN SERPL BCP-MCNC: 4.3 G/DL (ref 3.5–5.2)
ALP SERPL-CCNC: 64 U/L (ref 55–135)
ALT SERPL W/O P-5'-P-CCNC: 72 U/L (ref 10–44)
ANION GAP SERPL CALC-SCNC: 12 MMOL/L (ref 8–16)
AST SERPL-CCNC: 35 U/L (ref 10–40)
BILIRUB SERPL-MCNC: 0.6 MG/DL (ref 0.1–1)
BUN SERPL-MCNC: 18 MG/DL (ref 8–23)
CALCIUM SERPL-MCNC: 9.9 MG/DL (ref 8.7–10.5)
CHLORIDE SERPL-SCNC: 105 MMOL/L (ref 95–110)
CHOLEST SERPL-MCNC: 257 MG/DL (ref 120–199)
CHOLEST/HDLC SERPL: 6.8 {RATIO} (ref 2–5)
CO2 SERPL-SCNC: 23 MMOL/L (ref 23–29)
CREAT SERPL-MCNC: 0.8 MG/DL (ref 0.5–1.4)
EST. GFR  (AFRICAN AMERICAN): >60 ML/MIN/1.73 M^2
EST. GFR  (NON AFRICAN AMERICAN): >60 ML/MIN/1.73 M^2
ESTIMATED AVG GLUCOSE: 123 MG/DL (ref 68–131)
GLUCOSE SERPL-MCNC: 108 MG/DL (ref 70–110)
HBA1C MFR BLD HPLC: 5.9 % (ref 4.5–6.2)
HDLC SERPL-MCNC: 38 MG/DL (ref 40–75)
HDLC SERPL: 14.8 % (ref 20–50)
LDLC SERPL CALC-MCNC: ABNORMAL MG/DL (ref 63–159)
NONHDLC SERPL-MCNC: 219 MG/DL
POTASSIUM SERPL-SCNC: 4.4 MMOL/L (ref 3.5–5.1)
PROT SERPL-MCNC: 7.3 G/DL (ref 6–8.4)
SODIUM SERPL-SCNC: 140 MMOL/L (ref 136–145)
TRIGL SERPL-MCNC: 536 MG/DL (ref 30–150)
TSH SERPL DL<=0.005 MIU/L-ACNC: 1 UIU/ML (ref 0.34–5.6)

## 2020-07-14 PROCEDURE — 70486 CT MAXILLOFACIAL W/O DYE: CPT | Mod: TC,PO

## 2020-07-14 PROCEDURE — 77067 SCR MAMMO BI INCL CAD: CPT | Mod: TC,PO

## 2020-07-14 PROCEDURE — 83036 HEMOGLOBIN GLYCOSYLATED A1C: CPT

## 2020-07-14 PROCEDURE — 73620 X-RAY EXAM OF FOOT: CPT | Mod: TC,50,PO

## 2020-07-14 PROCEDURE — 80053 COMPREHEN METABOLIC PANEL: CPT

## 2020-07-14 PROCEDURE — 80061 LIPID PANEL: CPT

## 2020-07-14 PROCEDURE — 82306 VITAMIN D 25 HYDROXY: CPT

## 2020-07-14 PROCEDURE — 36415 COLL VENOUS BLD VENIPUNCTURE: CPT

## 2020-07-14 PROCEDURE — 84443 ASSAY THYROID STIM HORMONE: CPT

## 2020-08-11 ENCOUNTER — OFFICE VISIT (OUTPATIENT)
Dept: FAMILY MEDICINE | Facility: CLINIC | Age: 65
End: 2020-08-11
Payer: COMMERCIAL

## 2020-08-11 VITALS
RESPIRATION RATE: 18 BRPM | OXYGEN SATURATION: 97 % | WEIGHT: 194.31 LBS | HEART RATE: 78 BPM | SYSTOLIC BLOOD PRESSURE: 138 MMHG | HEIGHT: 68 IN | DIASTOLIC BLOOD PRESSURE: 82 MMHG | BODY MASS INDEX: 29.45 KG/M2

## 2020-08-11 DIAGNOSIS — J32.9 CHRONIC SINUSITIS, UNSPECIFIED LOCATION: Primary | ICD-10-CM

## 2020-08-11 DIAGNOSIS — E78.2 MIXED HYPERLIPIDEMIA: ICD-10-CM

## 2020-08-11 DIAGNOSIS — M19.079 FOOT ARTHROPATHY: ICD-10-CM

## 2020-08-11 DIAGNOSIS — I10 BENIGN ESSENTIAL HYPERTENSION: ICD-10-CM

## 2020-08-11 DIAGNOSIS — R73.01 ELEVATED FASTING GLUCOSE: ICD-10-CM

## 2020-08-11 DIAGNOSIS — J34.89 CONCHA BULLOSA: ICD-10-CM

## 2020-08-11 PROCEDURE — 3008F BODY MASS INDEX DOCD: CPT | Mod: S$GLB,,, | Performed by: INTERNAL MEDICINE

## 2020-08-11 PROCEDURE — 3075F SYST BP GE 130 - 139MM HG: CPT | Mod: S$GLB,,, | Performed by: INTERNAL MEDICINE

## 2020-08-11 PROCEDURE — 3079F DIAST BP 80-89 MM HG: CPT | Mod: S$GLB,,, | Performed by: INTERNAL MEDICINE

## 2020-08-11 PROCEDURE — 99214 PR OFFICE/OUTPT VISIT, EST, LEVL IV, 30-39 MIN: ICD-10-PCS | Mod: S$GLB,,, | Performed by: INTERNAL MEDICINE

## 2020-08-11 PROCEDURE — 3079F PR MOST RECENT DIASTOLIC BLOOD PRESSURE 80-89 MM HG: ICD-10-PCS | Mod: S$GLB,,, | Performed by: INTERNAL MEDICINE

## 2020-08-11 PROCEDURE — 3075F PR MOST RECENT SYSTOLIC BLOOD PRESS GE 130-139MM HG: ICD-10-PCS | Mod: S$GLB,,, | Performed by: INTERNAL MEDICINE

## 2020-08-11 PROCEDURE — 3008F PR BODY MASS INDEX (BMI) DOCUMENTED: ICD-10-PCS | Mod: S$GLB,,, | Performed by: INTERNAL MEDICINE

## 2020-08-11 PROCEDURE — 99214 OFFICE O/P EST MOD 30 MIN: CPT | Mod: S$GLB,,, | Performed by: INTERNAL MEDICINE

## 2020-08-11 RX ORDER — PRAVASTATIN SODIUM 20 MG/1
20 TABLET ORAL DAILY
Qty: 90 TABLET | Refills: 3 | Status: SHIPPED | OUTPATIENT
Start: 2020-08-11 | End: 2021-08-25

## 2020-08-11 NOTE — ASSESSMENT & PLAN NOTE
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 Referred back to ENT for evaluation.

## 2020-08-11 NOTE — PROGRESS NOTES
ADULT FOLLOW-UP VISIT    Subjective:     Chief Complaint:  Follow-up      65-year-old woman here for follow-up of labs and recent imaging.  CT of the sinuses showed gregg bullosa causing a leftward deviation of the nasal septum.  Patient reports her sinus symptoms and recurrent sinus infections have generally been on the left side.  Labs were notable for elevated cholesterol.  Patient states she has previously put on fenofibrate due to very elevated triglycerides.  She had significant stomach upset with this medication and did not continue it due to side effects and cost.  She has never been on a statin. Pt's HbA1c was up a little from last check at 5.9%. Vitamin D was borderline low, rec OTC supplement. CMP notable for mildly elevated ALT likely due to fatty infiltration in the liver.  Follow-up  Associated symptoms include congestion and a sore throat. Pertinent negatives include no abdominal pain, anorexia, arthralgias, change in bowel habit, chest pain, chills, coughing, diaphoresis, fatigue, fever, headaches, joint swelling, myalgias, nausea, neck pain, numbness, rash, swollen glands, urinary symptoms, vertigo, visual change, vomiting or weakness.         Ms. Nettles  has a past medical history of Cataract, Hyperlipidemia, and Hypertension.    Family history and social history are reviewed and there are no significant changes.     ROS:  Review of Systems   Constitutional: Negative for chills, diaphoresis, fatigue and fever.   HENT: Positive for congestion and sore throat.    Respiratory: Negative for cough.    Cardiovascular: Negative for chest pain.   Gastrointestinal: Negative for abdominal pain, anorexia, change in bowel habit, nausea and vomiting.   Musculoskeletal: Negative for arthralgias, joint swelling, myalgias and neck pain.   Skin: Negative for rash.   Neurological: Negative for vertigo, weakness, numbness and headaches.          Current Outpatient Medications:     " clonazePAM (KLONOPIN) 1 MG tablet, Take 1 mg by mouth nightly as needed for Insomnia., Disp: , Rfl: 5    levocetirizine (XYZAL) 5 MG tablet, Take 1 tablet (5 mg total) by mouth every evening., Disp: 30 tablet, Rfl: 11    losartan (COZAAR) 100 MG tablet, Take 1 tablet (100 mg total) by mouth once daily., Disp: 90 tablet, Rfl: 1    mupirocin (BACTROBAN) 2 % ointment, Apply topically 3 (three) times daily., Disp: 1 Tube, Rfl: 1    omega-3 acid ethyl esters (LOVAZA) 1 gram capsule, , Disp: , Rfl:     spironolactone (ALDACTONE) 25 MG tablet, Take 1 tablet (25 mg total) by mouth once daily., Disp: 90 tablet, Rfl: 3    pravastatin (PRAVACHOL) 20 MG tablet, Take 1 tablet (20 mg total) by mouth once daily., Disp: 90 tablet, Rfl: 3      Objective:     Physical Examination:     /82   Pulse 78   Resp 18   Ht 5' 8" (1.727 m)   Wt 88.1 kg (194 lb 5 oz)   SpO2 97%   BMI 29.55 kg/m²     Physical Exam  Constitutional:       General: She is not in acute distress.     Appearance: She is well-developed. She is not diaphoretic.   HENT:      Right Ear: External ear normal.      Left Ear: External ear normal.      Nose: Nose normal.   Eyes:      General:         Right eye: No discharge.         Left eye: No discharge.      Conjunctiva/sclera: Conjunctivae normal.      Pupils: Pupils are equal, round, and reactive to light.   Cardiovascular:      Rate and Rhythm: Normal rate and regular rhythm.      Heart sounds: Normal heart sounds. No murmur.   Pulmonary:      Effort: Pulmonary effort is normal. No respiratory distress.      Breath sounds: Normal breath sounds. No wheezing or rales.   Neurological:      Mental Status: She is alert and oriented to person, place, and time.         Assessment/Plan:   Mavis is a 65 y.o. female here for follow-up.    Problem List Items Addressed This Visit        ENT    Chronic sinusitis - Primary    Current Assessment & Plan     CT sinus showed gregg bullosa and leftward septal deviation " w/o active sinusitis. Referred back to ENT for evaluation.          Relevant Orders    Ambulatory referral/consult to ENT       Cardiac/Vascular    Benign essential hypertension    Current Assessment & Plan     Well controlled on cozaar and aldactone. CMP normal.          Mixed hyperlipidemia    Current Assessment & Plan     Lipids elevated on fish oil alone. Start pravastatin 20mg daily and recheck lipids in 6 months.          Relevant Medications    pravastatin (PRAVACHOL) 20 MG tablet       Endocrine    Elevated fasting glucose    Current Assessment & Plan     A1c 5.9%. Continue dietary control.            Other Visit Diagnoses     Shy bullosa        Relevant Orders    Ambulatory referral/consult to ENT    Foot arthropathy        Relevant Orders    Ambulatory referral/consult to Podiatry          Health Maintenance   Topic Date Due    Mammogram  07/14/2022    Lipid Panel  07/14/2025    TETANUS VACCINE  06/06/2028    Hepatitis C Screening  Completed    Pneumococcal Vaccine (Medium Risk)  Discontinued           Discussion:     Follow up in about 6 months (around 2/11/2021).    Goals    None         Electronically signed by Sofie Higgins

## 2020-09-10 ENCOUNTER — OFFICE VISIT (OUTPATIENT)
Dept: PODIATRY | Facility: CLINIC | Age: 65
End: 2020-09-10
Payer: COMMERCIAL

## 2020-09-10 ENCOUNTER — HOSPITAL ENCOUNTER (OUTPATIENT)
Dept: RADIOLOGY | Facility: CLINIC | Age: 65
Discharge: HOME | End: 2020-09-10
Attending: PODIATRIST
Payer: COMMERCIAL

## 2020-09-10 VITALS
HEIGHT: 68 IN | RESPIRATION RATE: 18 BRPM | HEART RATE: 78 BPM | WEIGHT: 194 LBS | BODY MASS INDEX: 29.4 KG/M2 | SYSTOLIC BLOOD PRESSURE: 138 MMHG | TEMPERATURE: 99 F | DIASTOLIC BLOOD PRESSURE: 80 MMHG

## 2020-09-10 DIAGNOSIS — M79.671 BILATERAL FOOT PAIN: Primary | ICD-10-CM

## 2020-09-10 DIAGNOSIS — Q66.70 CONGENITAL PES CAVUS: ICD-10-CM

## 2020-09-10 DIAGNOSIS — M19.079 ARTHRITIS OF FOOT: ICD-10-CM

## 2020-09-10 DIAGNOSIS — M54.16 LUMBAR RADICULOPATHY: ICD-10-CM

## 2020-09-10 DIAGNOSIS — M79.672 BILATERAL FOOT PAIN: Primary | ICD-10-CM

## 2020-09-10 PROCEDURE — 3008F PR BODY MASS INDEX (BMI) DOCUMENTED: ICD-10-PCS | Mod: S$GLB,,, | Performed by: PODIATRIST

## 2020-09-10 PROCEDURE — 3079F DIAST BP 80-89 MM HG: CPT | Mod: S$GLB,,, | Performed by: PODIATRIST

## 2020-09-10 PROCEDURE — 73630 XR FOOT COMPLETE 3 VIEW BILATERAL: ICD-10-PCS | Mod: 50,S$GLB,, | Performed by: PODIATRIST

## 2020-09-10 PROCEDURE — 99203 OFFICE O/P NEW LOW 30 MIN: CPT | Mod: S$GLB,,, | Performed by: PODIATRIST

## 2020-09-10 PROCEDURE — 3075F SYST BP GE 130 - 139MM HG: CPT | Mod: S$GLB,,, | Performed by: PODIATRIST

## 2020-09-10 PROCEDURE — 3079F PR MOST RECENT DIASTOLIC BLOOD PRESSURE 80-89 MM HG: ICD-10-PCS | Mod: S$GLB,,, | Performed by: PODIATRIST

## 2020-09-10 PROCEDURE — 3008F BODY MASS INDEX DOCD: CPT | Mod: S$GLB,,, | Performed by: PODIATRIST

## 2020-09-10 PROCEDURE — 3075F PR MOST RECENT SYSTOLIC BLOOD PRESS GE 130-139MM HG: ICD-10-PCS | Mod: S$GLB,,, | Performed by: PODIATRIST

## 2020-09-10 PROCEDURE — 73630 X-RAY EXAM OF FOOT: CPT | Mod: 50,S$GLB,, | Performed by: PODIATRIST

## 2020-09-10 PROCEDURE — 99203 PR OFFICE/OUTPT VISIT, NEW, LEVL III, 30-44 MIN: ICD-10-PCS | Mod: S$GLB,,, | Performed by: PODIATRIST

## 2020-09-10 RX ORDER — MELOXICAM 15 MG/1
15 TABLET ORAL DAILY
Qty: 30 TABLET | Refills: 1 | Status: SHIPPED | OUTPATIENT
Start: 2020-09-10 | End: 2020-11-02

## 2020-09-10 NOTE — PROGRESS NOTES
[address] [facility] [address]  [place] [address]  Phone: [phone]   [phone]    Patient's PCP:Sofie Higgins MD  Referring Provider: No ref. provider found    Subjective:      Chief Complaint:: Foot Pain (bilateral foot pain)    GIO Nettles is a 65 y.o. female who presents with a complaint of  Bilateral foot pain lasting for sometimes. Onset of the symptoms was pain and numbness lateral aspect of both feet.  Current symptoms include lateral aspect pain and numbness, burning at night, stiffening in toes, feels like arches are flatening.  Aggravating factors are walking, weightbearing, worse at night. Symptoms have progressed.Treatment to date have included running shoes, compression socks, gabapentin, nerve sheild plus, CBD oil, ice, rest, elevation  Patients rates pain 6/10 on pain scale.    Systemic Doctor: Sofie Higgins MD  Date Last Seen: 08/11/2020     Vitals:    09/10/20 1626   BP: 138/80   Pulse: 78   Resp: 18   Temp: 99 °F (37.2 °C)     Shoe Size:  8.5-9    Past Surgical History:   Procedure Laterality Date    APPENDECTOMY      CATARACT EXTRACTION W/  INTRAOCULAR LENS IMPLANT Right 10/24/2018    Procedure: EXTRACTION, CATARACT, WITH IOL INSERTION;  Surgeon: Marito Boyle II, MD;  Location: [facility] OR;  Service: Ophthalmology;  Laterality: Right;    CATARACT EXTRACTION W/  INTRAOCULAR LENS IMPLANT Left 11/14/2018    Procedure: EXTRACTION, CATARACT, WITH IOL INSERTION;  Surgeon: Marito Boyle II, MD;  Location: [facility] OR;  Service: Ophthalmology;  Laterality: Left;    Cholecystectomy  15 yrs ago    CHOLECYSTECTOMY      COLON SURGERY      HYSTERECTOMY  21yrs ago    LAMINECTOMY  21yrs ago    OOPHORECTOMY  21yrs ago    SPINE SURGERY      TUBAL LIGATION  26yrs ago     Past Medical History:   Diagnosis Date    Cataract     Hyperlipidemia     Hypertension      Family History   Problem Relation Age of Onset    Cancer Mother     Stroke Mother     Breast cancer Mother     COPD  Father     Hearing loss Father     Heart disease Father     Hyperlipidemia Father     Hypertension Father     Arthritis Maternal Grandmother     Stroke Maternal Grandmother     Cancer Maternal Grandfather     Arthritis Paternal Grandmother     Depression Paternal Grandmother     Diabetes Paternal Grandmother     Alcohol abuse Paternal Grandfather         Social History:   Marital Status:   Alcohol History:  reports no history of alcohol use.  Tobacco History:  reports that she has been smoking. She has never used smokeless tobacco.  Drug History:  reports no history of drug use.    Review of patient's allergies indicates:  No Known Allergies    Current Outpatient Medications   Medication Sig Dispense Refill    clonazePAM (KLONOPIN) 1 MG tablet Take 1 mg by mouth nightly as needed for Insomnia.  5    levocetirizine (XYZAL) 5 MG tablet Take 1 tablet (5 mg total) by mouth every evening. 30 tablet 11    losartan (COZAAR) 100 MG tablet Take 1 tablet (100 mg total) by mouth once daily. 90 tablet 1    meloxicam (MOBIC) 15 MG tablet Take 1 tablet (15 mg total) by mouth once daily. 30 tablet 1    mupirocin (BACTROBAN) 2 % ointment Apply topically 3 (three) times daily. 1 Tube 1    omega-3 acid ethyl esters (LOVAZA) 1 gram capsule       pravastatin (PRAVACHOL) 20 MG tablet Take 1 tablet (20 mg total) by mouth once daily. 90 tablet 3    spironolactone (ALDACTONE) 25 MG tablet Take 1 tablet (25 mg total) by mouth once daily. 90 tablet 3     No current facility-administered medications for this visit.        Review of Systems   Neurological: Positive for numbness.         Objective:        Physical Exam:   Foot Exam    General  General Appearance: appears stated age and healthy   Orientation: alert and oriented to person, place, and time   Affect: appropriate   Gait: antalgic       Right Foot/Ankle     Inspection and Palpation  Ecchymosis: none  Tenderness: (Fore foot, lis francs joint and mid tarsal  joint)  Swelling: (Mild swelling foot)  Arch: pes cavus (fore foot equinus)  Hammertoes: absent  Claw Toes: absent  Hallux valgus: yes (Hallux interphalangeus)  Hallux limitus: no  Skin Exam: skin intact;     Neurovascular  Dorsalis pedis: 2+  Posterior tibial: 2+  Saphenous nerve sensation: normal  Tibial nerve sensation: normal  Superficial peroneal nerve sensation: normal  Deep peroneal nerve sensation: normal  Sural nerve sensation: normal    Muscle Strength  Ankle dorsiflexion: 5  Ankle plantar flexion: 5  Ankle inversion: 5  Ankle eversion: 5  Great toe extension: 5  Great toe flexion: 5    Range of Motion    Normal right ankle ROM      Left Foot/Ankle      Inspection and Palpation  Ecchymosis: none  Tenderness: lesser metatarsophalangeal joints (Mid tarsal joint and lis francs joint.  Fore foot pain)  Swelling: (Mild swelling foot)  Arch: pes cavus (fore foot equinus)  Hammertoes: absent  Claw toes: absent  Hallux valgus: yes (hallux interphalangeus)  Hallux limitus: no  Skin Exam: skin intact;     Neurovascular  Dorsalis pedis: 2+  Posterior tibial: 2+  Saphenous nerve sensation: normal  Tibial nerve sensation: normal  Superficial peroneal nerve sensation: normal  Deep peroneal nerve sensation: normal  Sural nerve sensation: normal    Muscle Strength  Ankle dorsiflexion: 5  Ankle plantar flexion: 5  Ankle inversion: 5  Ankle eversion: 5  Great toe extension: 5  Great toe flexion: 5    Range of Motion    Normal left ankle ROM          Physical Exam   Cardiovascular:   Pulses:       Dorsalis pedis pulses are 2+ on the right side and 2+ on the left side.        Posterior tibial pulses are 2+ on the right side and 2+ on the left side.   Musculoskeletal:      Right foot: Bunion (Hallux interphalangeus) present.      Left foot: Bunion (hallux interphalangeus) present.        Feet:        Imaging:   AP, lateral and lateral oblique weight bearing x-rays bilateral feet:  No acute fractures, no bone tumors, no soft  tissue masses seen.  Pes Cavus foot structure bilateral.  Inferior and posterior calcaneal exostosis.           Assessment:       1. Bilateral foot pain    2. Lumbar radiculopathy    3. Congenital pes cavus    4. Arthritis of foot      Plan:   Bilateral foot pain  -     X-Ray Foot Complete Bilateral    Lumbar radiculopathy    Congenital pes cavus    Arthritis of foot    Other orders  -     meloxicam (MOBIC) 15 MG tablet; Take 1 tablet (15 mg total) by mouth once daily.  Dispense: 30 tablet; Refill: 1    1.  I discussed clinical and xray findings with the patient.  I discussed her foot structure with fore foot equinus and how that places stress on the fore foot and on the arch and can cause pain and arthritis.  I discussed peripheral neuropathy and I recommend she see neurologist or orthopedics for her back.  I believe her foot and leg numbness and night spasm of the left leg are neurological and probably form her back problems.    2.  I am recommending running shoes and refer her to [facility] for fitting of running shoes.  I also am recommending spenco cross trainers and mobic 15mg one pill with food daily.  3.  Return to see me as needed.  No follow-ups on file.    Procedures - None    Counseling:   Patient  was made aware of inspecting their feet.  Patient was told to be aware of any breaks in the skin or redness.  With neuropathy, these areas are not recognized early due to the numbness.  I discussed the lab test and NCV EMG test and also the role of the neurologist in evaluating the patient.  I discussed Diabetes, lower back issues, metabolic disorders, systemic causes, chemotherapy, viatmain defiencey, heavy metal exposure, as some of the causes.  I also explained that as much as 40% of the time we can not find a cause.  I discussed different treatments available to control the symptoms but whcih may not cure the problem.    I discussed the conservative treatent of arthritis consisiting of shoe  modification, OTC NSAID, cortisone shots, a series of supartz injections, avoiding painful activities and common sense.  I explained that the arthritis may become more painful causng more disability and the possibility of further treatment.  Also discussed may need evaluation by Rheumatologist for possible inflammatory arthritis.  I provided patient education verbally regarding:   Patient diagnosis, treatment options, as well as alternatives, risks, and benefits.     This note was created using Dragon voice recognition software that occasionally misinterpreted phrases or words.                 Answers for HPI/ROS submitted by the patient on 9/6/2020   Chronicity: chronic  Onset: more than 1 year ago  Frequency: daily  Progression since onset: unchanged  Aggravating factors: nothing  Treatments tried: acetaminophen, NSAIDs  Improvement on treatment: mild

## 2020-11-02 RX ORDER — MELOXICAM 15 MG/1
TABLET ORAL
Qty: 30 TABLET | Refills: 1 | Status: SHIPPED | OUTPATIENT
Start: 2020-11-02 | End: 2021-03-17

## 2020-11-02 NOTE — TELEPHONE ENCOUNTER
Check with patient to see if they are requesting prescription versus automatic request from pharmacy.

## 2020-12-22 ENCOUNTER — IMMUNIZATION (OUTPATIENT)
Dept: FAMILY MEDICINE | Facility: CLINIC | Age: 65
End: 2020-12-22
Payer: COMMERCIAL

## 2020-12-22 DIAGNOSIS — Z23 NEED FOR VACCINATION: ICD-10-CM

## 2020-12-22 PROCEDURE — 91300 COVID-19, MRNA, LNP-S, PF, 30 MCG/0.3 ML DOSE VACCINE: ICD-10-PCS | Mod: S$GLB,,, | Performed by: INTERNAL MEDICINE

## 2020-12-22 PROCEDURE — 0001A COVID-19, MRNA, LNP-S, PF, 30 MCG/0.3 ML DOSE VACCINE: CPT | Mod: S$GLB,,, | Performed by: INTERNAL MEDICINE

## 2020-12-22 PROCEDURE — 91300 COVID-19, MRNA, LNP-S, PF, 30 MCG/0.3 ML DOSE VACCINE: CPT | Mod: S$GLB,,, | Performed by: INTERNAL MEDICINE

## 2020-12-22 PROCEDURE — 0001A COVID-19, MRNA, LNP-S, PF, 30 MCG/0.3 ML DOSE VACCINE: ICD-10-PCS | Mod: S$GLB,,, | Performed by: INTERNAL MEDICINE

## 2021-01-12 ENCOUNTER — IMMUNIZATION (OUTPATIENT)
Dept: FAMILY MEDICINE | Facility: CLINIC | Age: 66
End: 2021-01-12
Payer: COMMERCIAL

## 2021-01-12 DIAGNOSIS — Z23 NEED FOR VACCINATION: ICD-10-CM

## 2021-01-12 PROCEDURE — 0002A COVID-19, MRNA, LNP-S, PF, 30 MCG/0.3 ML DOSE VACCINE: ICD-10-PCS | Mod: CV19,S$GLB,, | Performed by: INTERNAL MEDICINE

## 2021-01-12 PROCEDURE — 91300 COVID-19, MRNA, LNP-S, PF, 30 MCG/0.3 ML DOSE VACCINE: ICD-10-PCS | Mod: S$GLB,,, | Performed by: INTERNAL MEDICINE

## 2021-01-12 PROCEDURE — 0002A COVID-19, MRNA, LNP-S, PF, 30 MCG/0.3 ML DOSE VACCINE: CPT | Mod: CV19,S$GLB,, | Performed by: INTERNAL MEDICINE

## 2021-01-12 PROCEDURE — 91300 COVID-19, MRNA, LNP-S, PF, 30 MCG/0.3 ML DOSE VACCINE: CPT | Mod: S$GLB,,, | Performed by: INTERNAL MEDICINE

## 2021-02-17 ENCOUNTER — OFFICE VISIT (OUTPATIENT)
Dept: FAMILY MEDICINE | Facility: CLINIC | Age: 66
End: 2021-02-17
Payer: COMMERCIAL

## 2021-02-17 VITALS
OXYGEN SATURATION: 98 % | RESPIRATION RATE: 18 BRPM | HEART RATE: 93 BPM | BODY MASS INDEX: 28.54 KG/M2 | HEIGHT: 68 IN | SYSTOLIC BLOOD PRESSURE: 160 MMHG | WEIGHT: 188.31 LBS | DIASTOLIC BLOOD PRESSURE: 90 MMHG

## 2021-02-17 DIAGNOSIS — E78.2 MIXED HYPERLIPIDEMIA: ICD-10-CM

## 2021-02-17 DIAGNOSIS — R73.01 ELEVATED FASTING GLUCOSE: ICD-10-CM

## 2021-02-17 DIAGNOSIS — J32.9 CHRONIC SINUSITIS, UNSPECIFIED LOCATION: ICD-10-CM

## 2021-02-17 DIAGNOSIS — G47.33 OSA (OBSTRUCTIVE SLEEP APNEA): ICD-10-CM

## 2021-02-17 DIAGNOSIS — I10 BENIGN ESSENTIAL HYPERTENSION: Primary | ICD-10-CM

## 2021-02-17 DIAGNOSIS — G62.9 NEUROPATHY: ICD-10-CM

## 2021-02-17 PROCEDURE — 3077F SYST BP >= 140 MM HG: CPT | Mod: S$GLB,,, | Performed by: INTERNAL MEDICINE

## 2021-02-17 PROCEDURE — 3008F PR BODY MASS INDEX (BMI) DOCUMENTED: ICD-10-PCS | Mod: S$GLB,,, | Performed by: INTERNAL MEDICINE

## 2021-02-17 PROCEDURE — 3080F PR MOST RECENT DIASTOLIC BLOOD PRESSURE >= 90 MM HG: ICD-10-PCS | Mod: S$GLB,,, | Performed by: INTERNAL MEDICINE

## 2021-02-17 PROCEDURE — 99214 PR OFFICE/OUTPT VISIT, EST, LEVL IV, 30-39 MIN: ICD-10-PCS | Mod: S$GLB,,, | Performed by: INTERNAL MEDICINE

## 2021-02-17 PROCEDURE — 99214 OFFICE O/P EST MOD 30 MIN: CPT | Mod: S$GLB,,, | Performed by: INTERNAL MEDICINE

## 2021-02-17 PROCEDURE — 3008F BODY MASS INDEX DOCD: CPT | Mod: S$GLB,,, | Performed by: INTERNAL MEDICINE

## 2021-02-17 PROCEDURE — 3077F PR MOST RECENT SYSTOLIC BLOOD PRESSURE >= 140 MM HG: ICD-10-PCS | Mod: S$GLB,,, | Performed by: INTERNAL MEDICINE

## 2021-02-17 PROCEDURE — 3080F DIAST BP >= 90 MM HG: CPT | Mod: S$GLB,,, | Performed by: INTERNAL MEDICINE

## 2021-02-17 RX ORDER — FLUTICASONE PROPIONATE 50 MCG
SPRAY, SUSPENSION (ML) NASAL
COMMUNITY
Start: 2021-01-20

## 2021-02-17 RX ORDER — SPIRONOLACTONE 50 MG/1
50 TABLET, FILM COATED ORAL DAILY
Qty: 30 TABLET | Refills: 5 | Status: SHIPPED | OUTPATIENT
Start: 2021-02-17 | End: 2021-10-01

## 2021-03-15 ENCOUNTER — TELEPHONE (OUTPATIENT)
Dept: FAMILY MEDICINE | Facility: CLINIC | Age: 66
End: 2021-03-15

## 2021-03-17 ENCOUNTER — OFFICE VISIT (OUTPATIENT)
Dept: FAMILY MEDICINE | Facility: CLINIC | Age: 66
End: 2021-03-17
Payer: COMMERCIAL

## 2021-03-17 VITALS
OXYGEN SATURATION: 98 % | BODY MASS INDEX: 28.25 KG/M2 | TEMPERATURE: 98 F | WEIGHT: 186.38 LBS | DIASTOLIC BLOOD PRESSURE: 88 MMHG | RESPIRATION RATE: 20 BRPM | HEART RATE: 93 BPM | HEIGHT: 68 IN | SYSTOLIC BLOOD PRESSURE: 138 MMHG

## 2021-03-17 DIAGNOSIS — J01.40 ACUTE NON-RECURRENT PANSINUSITIS: Primary | ICD-10-CM

## 2021-03-17 PROBLEM — K57.90 DIVERTICULAR DISEASE: Status: ACTIVE | Noted: 2021-03-17

## 2021-03-17 PROCEDURE — 99213 PR OFFICE/OUTPT VISIT, EST, LEVL III, 20-29 MIN: ICD-10-PCS | Mod: S$GLB,,, | Performed by: NURSE PRACTITIONER

## 2021-03-17 PROCEDURE — 3075F SYST BP GE 130 - 139MM HG: CPT | Mod: S$GLB,,, | Performed by: NURSE PRACTITIONER

## 2021-03-17 PROCEDURE — 3075F PR MOST RECENT SYSTOLIC BLOOD PRESS GE 130-139MM HG: ICD-10-PCS | Mod: S$GLB,,, | Performed by: NURSE PRACTITIONER

## 2021-03-17 PROCEDURE — 3008F BODY MASS INDEX DOCD: CPT | Mod: S$GLB,,, | Performed by: NURSE PRACTITIONER

## 2021-03-17 PROCEDURE — 3079F DIAST BP 80-89 MM HG: CPT | Mod: S$GLB,,, | Performed by: NURSE PRACTITIONER

## 2021-03-17 PROCEDURE — 99213 OFFICE O/P EST LOW 20 MIN: CPT | Mod: S$GLB,,, | Performed by: NURSE PRACTITIONER

## 2021-03-17 PROCEDURE — 3079F PR MOST RECENT DIASTOLIC BLOOD PRESSURE 80-89 MM HG: ICD-10-PCS | Mod: S$GLB,,, | Performed by: NURSE PRACTITIONER

## 2021-03-17 PROCEDURE — 1126F PR PAIN SEVERITY QUANTIFIED, NO PAIN PRESENT: ICD-10-PCS | Mod: S$GLB,,, | Performed by: NURSE PRACTITIONER

## 2021-03-17 PROCEDURE — 3008F PR BODY MASS INDEX (BMI) DOCUMENTED: ICD-10-PCS | Mod: S$GLB,,, | Performed by: NURSE PRACTITIONER

## 2021-03-17 PROCEDURE — 1126F AMNT PAIN NOTED NONE PRSNT: CPT | Mod: S$GLB,,, | Performed by: NURSE PRACTITIONER

## 2021-03-17 RX ORDER — AZELASTINE 1 MG/ML
1 SPRAY, METERED NASAL 2 TIMES DAILY
Qty: 30 ML | Refills: 0 | Status: SHIPPED | OUTPATIENT
Start: 2021-03-17 | End: 2022-10-19

## 2021-03-17 RX ORDER — DOXYCYCLINE HYCLATE 100 MG
100 TABLET ORAL 2 TIMES DAILY
Qty: 20 TABLET | Refills: 0 | Status: SHIPPED | OUTPATIENT
Start: 2021-03-17 | End: 2021-03-27

## 2022-07-27 ENCOUNTER — OFFICE VISIT (OUTPATIENT)
Dept: DERMATOLOGY | Facility: CLINIC | Age: 67
End: 2022-07-27
Payer: MEDICARE

## 2022-07-27 VITALS — HEIGHT: 68 IN | BODY MASS INDEX: 28.19 KG/M2 | WEIGHT: 186 LBS

## 2022-07-27 DIAGNOSIS — L82.1 SEBORRHEIC KERATOSES: ICD-10-CM

## 2022-07-27 DIAGNOSIS — D22.9 MULTIPLE BENIGN NEVI: Primary | ICD-10-CM

## 2022-07-27 DIAGNOSIS — D18.01 CHERRY ANGIOMA: ICD-10-CM

## 2022-07-27 DIAGNOSIS — Z12.83 SKIN CANCER SCREENING: ICD-10-CM

## 2022-07-27 PROCEDURE — 99203 OFFICE O/P NEW LOW 30 MIN: CPT | Mod: S$PBB,,, | Performed by: DERMATOLOGY

## 2022-07-27 PROCEDURE — 99213 OFFICE O/P EST LOW 20 MIN: CPT | Mod: PBBFAC,PO | Performed by: DERMATOLOGY

## 2022-07-27 PROCEDURE — 99999 PR PBB SHADOW E&M-EST. PATIENT-LVL III: ICD-10-PCS | Mod: PBBFAC,,, | Performed by: DERMATOLOGY

## 2022-07-27 PROCEDURE — 99203 PR OFFICE/OUTPT VISIT, NEW, LEVL III, 30-44 MIN: ICD-10-PCS | Mod: S$PBB,,, | Performed by: DERMATOLOGY

## 2022-07-27 PROCEDURE — 99999 PR PBB SHADOW E&M-EST. PATIENT-LVL III: CPT | Mod: PBBFAC,,, | Performed by: DERMATOLOGY

## 2022-07-27 NOTE — PROGRESS NOTES
Subjective:       Patient ID:  Mavis Nettles is a 67 y.o. female who presents for   Chief Complaint   Patient presents with    Skin Check     tbsc    Spot     On legs     New patient    Here today for a TBSC  C/o dry spots  C/o bumps on her legs  C/o skin tags    Has no hx of NMSC  Has no fhx of MM    Current Outpatient Medications:     clonazePAM (KLONOPIN) 1 MG tablet, Take 1 mg by mouth nightly as needed for Insomnia., Disp: , Rfl: 5    fluticasone propionate (FLONASE) 50 mcg/actuation nasal spray, USE 1 SPRAY EACH NOSTRIL EVERY 12 HOURS, Disp: , Rfl:     losartan (COZAAR) 100 MG tablet, TAKE 1 TABLET BY MOUTH EVERY DAY, Disp: 90 tablet, Rfl: 3    mupirocin (BACTROBAN) 2 % ointment, Apply topically 3 (three) times daily., Disp: 1 Tube, Rfl: 1    pravastatin (PRAVACHOL) 20 MG tablet, TAKE 1 TABLET BY MOUTH EVERY DAY, Disp: 90 tablet, Rfl: 3    spironolactone (ALDACTONE) 50 MG tablet, TAKE 1 TABLET BY MOUTH EVERY DAY, Disp: 30 tablet, Rfl: 5    azelastine (ASTELIN) 137 mcg (0.1 %) nasal spray, 1 spray (137 mcg total) by Nasal route 2 (two) times daily. for 7 days, Disp: 30 mL, Rfl: 0      Review of Systems   Constitutional: Negative for fever, chills and fatigue.   Respiratory: Negative for cough and shortness of breath.    Skin: Positive for dry skin and daily sunscreen use. Negative for itching, rash, activity-related sunscreen use and wears hat.        Objective:    Physical Exam   Constitutional: She appears well-developed and well-nourished. No distress.   Neurological: She is alert and oriented to person, place, and time. She is not disoriented.   Psychiatric: She has a normal mood and affect.   Skin:   Areas Examined (abnormalities noted in diagram):   Scalp / Hair Palpated and Inspected  Head / Face Inspection Performed  Neck Inspection Performed  Chest / Axilla Inspection Performed  Abdomen Inspection Performed  Genitals / Buttocks / Groin Inspection Performed  Back Inspection Performed  RUE  Inspected  LUE Inspection Performed  RLE Inspected  LLE Inspection Performed  Nails and Digits Inspection Performed                   Diagram Legend     Erythematous scaling macule/papule c/w actinic keratosis       Vascular papule c/w angioma      Pigmented verrucoid papule/plaque c/w seborrheic keratosis      Yellow umbilicated papule c/w sebaceous hyperplasia      Irregularly shaped tan macule c/w lentigo     1-2 mm smooth white papules consistent with Milia      Movable subcutaneous cyst with punctum c/w epidermal inclusion cyst      Subcutaneous movable cyst c/w pilar cyst      Firm pink to brown papule c/w dermatofibroma      Pedunculated fleshy papule(s) c/w skin tag(s)      Evenly pigmented macule c/w junctional nevus     Mildly variegated pigmented, slightly irregular-bordered macule c/w mildly atypical nevus      Flesh colored to evenly pigmented papule c/w intradermal nevus       Pink pearly papule/plaque c/w basal cell carcinoma      Erythematous hyperkeratotic cursted plaque c/w SCC      Surgical scar with no sign of skin cancer recurrence      Open and closed comedones      Inflammatory papules and pustules      Verrucoid papule consistent consistent with wart     Erythematous eczematous patches and plaques     Dystrophic onycholytic nail with subungual debris c/w onychomycosis     Umbilicated papule    Erythematous-base heme-crusted tan verrucoid plaque consistent with inflamed seborrheic keratosis     Erythematous Silvery Scaling Plaque c/w Psoriasis     See annotation      Assessment / Plan:        Multiple benign nevi  Careful dermoscopy evaluation of nevi performed with none identified as needing biopsy today  Monitor for new mole or moles that are becoming bigger, darker, irritated, or developing irregular borders.     Cherry angioma  This is a benign vascular lesion. Reassurance given. No treatment required.     Seborrheic keratoses  These are benign inherited growths without a malignant  potential. Reassurance given to patient. No treatment is necessary.     Skin cancer screening  Total body skin examination performed today including at least 12 points as noted in physical examination. No lesions suspicious for malignancy noted.    Patient instructed in importance in daily broad spectrum sun protection of at least spf 30. Mineral sunscreen ingredients preferred (Zinc +/- Titanium) and can be found OTC.   Recommend Elta MD for daily use on face and neck.  Patient encouraged to wear hat for all outdoor exposure.   Also discussed sun avoidance and use of protective clothing.           Follow up if symptoms worsen or fail to improve.

## 2022-08-19 DIAGNOSIS — Z12.31 ENCOUNTER FOR SCREENING MAMMOGRAM FOR BREAST CANCER: Primary | ICD-10-CM

## 2022-09-01 ENCOUNTER — HOSPITAL ENCOUNTER (OUTPATIENT)
Dept: RADIOLOGY | Facility: HOSPITAL | Age: 67
Discharge: HOME | End: 2022-09-01
Attending: NURSE PRACTITIONER
Payer: MEDICARE

## 2022-09-01 DIAGNOSIS — Z12.31 ENCOUNTER FOR SCREENING MAMMOGRAM FOR BREAST CANCER: ICD-10-CM

## 2022-09-01 PROCEDURE — 77063 BREAST TOMOSYNTHESIS BI: CPT | Mod: TC,PO

## 2022-10-19 ENCOUNTER — OFFICE VISIT (OUTPATIENT)
Dept: PODIATRY | Facility: CLINIC | Age: 67
End: 2022-10-19
Payer: MEDICARE

## 2022-10-19 VITALS — BODY MASS INDEX: 28.79 KG/M2 | WEIGHT: 190 LBS | HEIGHT: 68 IN

## 2022-10-19 DIAGNOSIS — M79.674 PAIN OF TOE OF RIGHT FOOT: ICD-10-CM

## 2022-10-19 DIAGNOSIS — L60.0 INGROWN NAIL: Primary | ICD-10-CM

## 2022-10-19 PROCEDURE — 99213 OFFICE O/P EST LOW 20 MIN: CPT | Mod: 25,S$GLB,, | Performed by: PODIATRIST

## 2022-10-19 PROCEDURE — 99213 PR OFFICE/OUTPT VISIT, EST, LEVL III, 20-29 MIN: ICD-10-PCS | Mod: 25,S$GLB,, | Performed by: PODIATRIST

## 2022-10-19 PROCEDURE — 11750 NAIL REMOVAL: ICD-10-PCS | Mod: T5,S$GLB,, | Performed by: PODIATRIST

## 2022-10-19 PROCEDURE — 11750 EXCISION NAIL&NAIL MATRIX: CPT | Mod: T5,S$GLB,, | Performed by: PODIATRIST

## 2022-10-19 NOTE — PROGRESS NOTES
"  [address] [facility] [address]  [place] [address]  Phone: [phone]   [phone]    Patient's PCP:Primary Doctor No  Referring Provider: No ref. provider found    Subjective:      Chief Complaint:: Ingrown Toenail (Right great toenail medial border)    GIO Nettles is a 67 y.o. female who presents today with a complaint of right great ingrown toenail medial border lasting for six weeks and reports no trauma.  Current symptoms include pain and swelling medial border of right great toenail.  Aggravating factors are closed toe shoes. Symptoms have progressed. Treatment to date have included pedicure.      Vitals:    10/19/22 1349   Weight: 86.2 kg (190 lb)   Height: 5' 8" (1.727 m)   PainSc:   6          Past Surgical History:   Procedure Laterality Date    APPENDECTOMY      CATARACT EXTRACTION W/  INTRAOCULAR LENS IMPLANT Right 10/24/2018    Procedure: EXTRACTION, CATARACT, WITH IOL INSERTION;  Surgeon: Marito Boyle II, MD;  Location: [facility] OR;  Service: Ophthalmology;  Laterality: Right;    CATARACT EXTRACTION W/  INTRAOCULAR LENS IMPLANT Left 11/14/2018    Procedure: EXTRACTION, CATARACT, WITH IOL INSERTION;  Surgeon: Marito Boyle II, MD;  Location: [facility] OR;  Service: Ophthalmology;  Laterality: Left;    Cholecystectomy  15 yrs ago    CHOLECYSTECTOMY      COLON SURGERY      HYSTERECTOMY  21yrs ago    LAMINECTOMY  21yrs ago    OOPHORECTOMY  21yrs ago    SPINE SURGERY      TUBAL LIGATION  26yrs ago     Past Medical History:   Diagnosis Date    Cataract     Hyperlipidemia     Hypertension      Family History   Problem Relation Age of Onset    Cancer Mother     Stroke Mother     Breast cancer Mother     COPD Father     Hearing loss Father     Heart disease Father     Hyperlipidemia Father     Hypertension Father     Breast cancer Maternal Aunt     Arthritis Maternal Grandmother     Stroke Maternal Grandmother     Cancer Maternal Grandfather     Arthritis Paternal Grandmother     Depression " Paternal Grandmother     Diabetes Paternal Grandmother     Alcohol abuse Paternal Grandfather         Social History:   Marital Status:   Alcohol History:  reports no history of alcohol use.  Tobacco History:  reports that she has been smoking. She has never used smokeless tobacco.  Drug History:  reports no history of drug use.    Review of patient's allergies indicates:  No Known Allergies    Current Outpatient Medications   Medication Sig Dispense Refill    clonazePAM (KLONOPIN) 1 MG tablet Take 1 mg by mouth nightly as needed for Insomnia.  5    fluticasone propionate (FLONASE) 50 mcg/actuation nasal spray USE 1 SPRAY EACH NOSTRIL EVERY 12 HOURS      losartan (COZAAR) 100 MG tablet TAKE 1 TABLET BY MOUTH EVERY DAY 90 tablet 3    mupirocin (BACTROBAN) 2 % ointment Apply topically 3 (three) times daily. 1 Tube 1    pravastatin (PRAVACHOL) 20 MG tablet TAKE 1 TABLET BY MOUTH EVERY DAY 90 tablet 3    spironolactone (ALDACTONE) 50 MG tablet TAKE 1 TABLET BY MOUTH EVERY DAY 30 tablet 5    azelastine (ASTELIN) 137 mcg (0.1 %) nasal spray 1 spray (137 mcg total) by Nasal route 2 (two) times daily. for 7 days 30 mL 0     No current facility-administered medications for this visit.       Review of Systems   Constitutional:  Negative for chills, fatigue, fever and unexpected weight change.   HENT:  Negative for hearing loss and trouble swallowing.    Eyes:  Negative for photophobia and visual disturbance.   Respiratory:  Negative for cough, shortness of breath and wheezing.    Cardiovascular:  Negative for chest pain, palpitations and leg swelling.   Gastrointestinal:  Negative for abdominal pain and nausea.   Genitourinary:  Negative for dysuria and frequency.   Musculoskeletal:  Positive for back pain. Negative for arthralgias, gait problem, joint swelling and myalgias.   Skin:  Negative for rash and wound.   Neurological:  Positive for numbness. Negative for tremors, seizures, weakness and headaches.    Hematological:  Does not bruise/bleed easily.       Objective:        Physical Exam:   Foot Exam    General  General Appearance: appears stated age and healthy   Orientation: alert and oriented to person, place, and time   Affect: appropriate   Gait: unimpaired       Right Foot/Ankle     Inspection and Palpation  Ecchymosis: none  Tenderness: (Medial border great toenail)  Swelling: (Medial border great toenail)  Arch: normal  Skin Exam: skin intact; no drainage, no ulcer and no erythema   Neurovascular  Dorsalis pedis: 2+  Posterior tibial: 2+  Capillary Refill: 2+  Varicose veins: not present  Saphenous nerve sensation: diminished  Tibial nerve sensation: diminished  Superficial peroneal nerve sensation: diminished  Deep peroneal nerve sensation: diminished  Sural nerve sensation: diminished    Edema  Type of edema: non-pitting    Muscle Strength  Ankle dorsiflexion: 5  Ankle plantar flexion: 5  Ankle inversion: 5  Ankle eversion: 5  Great toe extension: 5  Great toe flexion: 5    Range of Motion    Normal right ankle ROM    Tests  Anterior drawer: negative   Talar tilt: negative   PT Tinel's sign: negative    Paresthesia: positive  Comments  Ingrown medial border great toenail.  Tender to palpation.  Mild edema and erythema present.  No purulence.      Physical Exam  Cardiovascular:      Pulses:           Dorsalis pedis pulses are 2+ on the right side.        Posterior tibial pulses are 2+ on the right side.   Feet:      Right foot:      Skin integrity: No ulcer or erythema.             Right Ankle/Foot Exam     Range of Motion   The patient has normal right ankle ROM.    Comments:  Ingrown medial border great toenail.  Tender to palpation.  Mild edema and erythema present.  No purulence.        Muscle Strength   Right Lower Extremity   Ankle Dorsiflexion:  5   Plantar flexion:  5/5    Reflexes     Right Side   Paresthesia: present    Vascular Exam     Right Pulses  Dorsalis Pedis:      2+  Posterior Tibial:       2+         Imaging: none            Assessment:       1. Ingrown nail    2. Pain of toe of right foot      Plan:   Ingrown nail  -     Nail Removal    Pain of toe of right foot  -     Nail Removal    Follow up if symptoms worsen or fail to improve.    Nail Removal    Date/Time: 10/19/2022 1:50 PM  Performed by: Ammon Beach DPM  Authorized by: Ammon Beach DPM     Consent Done?:  Yes (Written)  Time out: Immediately prior to the procedure a time out was called    Prep: patient was prepped and draped in usual sterile fashion    Location:     Location:  Right foot    Location detail:  Right big toe  Anesthesia:     Anesthesia:  Local infiltration    Local anesthetic:  Lidocaine 1% without epinephrine  Procedure Details:     Preparation:  Skin prepped with alcohol    Amount removed:  Partial    Side:  Medial    Wedge excision of skin of nail fold: No      Nail bed sutured?: No      Nail matrix removed:  Partial    Removal method:  Phenol and alcohol    Dressing applied:  4x4    Patient tolerance:  Patient tolerated the procedure well with no immediate complications     Medial border           Counseling:     I provided patient education verbally regarding:   Patient diagnosis, treatment options, as well as alternatives, risks, and benefits.     Ingrown toenail treatment options of no treatment, avulsion of nail border under local with regrowth of nail, chemical matrixectomy for attempted permanent correction of the problem. Patient was educated about daily dressing changes, soaks, and medications following removal of the nail.       This note was created using Dragon voice recognition software that occasionally misinterpreted phrases or words.

## 2022-10-19 NOTE — PATIENT INSTRUCTIONS

## 2022-10-27 ENCOUNTER — TELEPHONE (OUTPATIENT)
Dept: PODIATRY | Facility: CLINIC | Age: 67
End: 2022-10-27
Payer: MEDICARE

## 2022-10-27 ENCOUNTER — PATIENT MESSAGE (OUTPATIENT)
Dept: PODIATRY | Facility: CLINIC | Age: 67
End: 2022-10-27
Payer: MEDICARE

## 2022-10-27 DIAGNOSIS — L60.0 INGROWN NAIL: Primary | ICD-10-CM

## 2022-10-27 DIAGNOSIS — M79.674 PAIN OF TOE OF RIGHT FOOT: ICD-10-CM

## 2022-10-27 RX ORDER — CEPHALEXIN 500 MG/1
500 CAPSULE ORAL EVERY 12 HOURS
Qty: 14 CAPSULE | Refills: 0 | Status: SHIPPED | OUTPATIENT
Start: 2022-10-27 | End: 2022-11-03

## 2022-10-27 NOTE — TELEPHONE ENCOUNTER
----- Message from Filomena Anthony sent at 10/27/2022  3:18 PM CDT -----  Contact: patient  Type: Needs Medical Advice  Who Called:  patient  Best Call Back Number: [phone] (home)   Additional Information: requesting a call back- wanted to see if someone looked at the picture of her toe- to see if she needs antibiotic or something

## 2023-01-20 ENCOUNTER — PATIENT MESSAGE (OUTPATIENT)
Dept: PODIATRY | Facility: CLINIC | Age: 68
End: 2023-01-20
Payer: MEDICARE

## 2023-01-20 NOTE — TELEPHONE ENCOUNTER
Per Patient Access Rep, patient stated she was given a script for an antibiotic but did not take it. Offered to schedule an appointment, but wanted to know if another antibiotic could be called in.    Please see patient advice request and please advise.

## 2023-03-24 DIAGNOSIS — K57.92 DIVERTICULITIS: Primary | ICD-10-CM

## 2023-03-30 ENCOUNTER — HOSPITAL ENCOUNTER (OUTPATIENT)
Dept: RADIOLOGY | Facility: HOSPITAL | Age: 68
Discharge: HOME | End: 2023-03-30
Attending: INTERNAL MEDICINE
Payer: MEDICARE

## 2023-03-30 DIAGNOSIS — K57.92 DIVERTICULITIS: ICD-10-CM

## 2023-03-30 LAB
CREAT SERPL-MCNC: 1.1 MG/DL (ref 0.5–1.4)
SAMPLE: NORMAL

## 2023-03-30 PROCEDURE — 25500020 PHARM REV CODE 255: Mod: PO | Performed by: INTERNAL MEDICINE

## 2023-03-30 PROCEDURE — 82565 ASSAY OF CREATININE: CPT | Mod: PO

## 2023-03-30 RX ADMIN — IOHEXOL 100 ML: 350 INJECTION, SOLUTION INTRAVENOUS at 02:03

## 2023-12-14 ENCOUNTER — HOSPITAL ENCOUNTER (EMERGENCY)
Facility: HOSPITAL | Age: 68
Discharge: HOME | End: 2023-12-14
Attending: EMERGENCY MEDICINE
Payer: MEDICARE

## 2023-12-14 VITALS
HEIGHT: 68 IN | DIASTOLIC BLOOD PRESSURE: 63 MMHG | BODY MASS INDEX: 27.58 KG/M2 | RESPIRATION RATE: 16 BRPM | OXYGEN SATURATION: 96 % | HEART RATE: 65 BPM | SYSTOLIC BLOOD PRESSURE: 117 MMHG | TEMPERATURE: 98 F | WEIGHT: 182 LBS

## 2023-12-14 DIAGNOSIS — K57.32 DIVERTICULITIS OF LARGE INTESTINE WITHOUT PERFORATION OR ABSCESS WITHOUT BLEEDING: ICD-10-CM

## 2023-12-14 DIAGNOSIS — R05.9 COUGH: ICD-10-CM

## 2023-12-14 DIAGNOSIS — J10.1 INFLUENZA A: Primary | ICD-10-CM

## 2023-12-14 LAB
ALBUMIN SERPL BCP-MCNC: 3.6 G/DL (ref 3.5–5.2)
ALP SERPL-CCNC: 55 U/L (ref 55–135)
ALT SERPL W/O P-5'-P-CCNC: 34 U/L (ref 10–44)
ANION GAP SERPL CALC-SCNC: 11 MMOL/L (ref 8–16)
AST SERPL-CCNC: 26 U/L (ref 10–40)
BASOPHILS # BLD AUTO: 0.02 K/UL (ref 0–0.2)
BASOPHILS NFR BLD: 0.2 % (ref 0–1.9)
BILIRUB SERPL-MCNC: 0.4 MG/DL (ref 0.1–1)
BUN SERPL-MCNC: 11 MG/DL (ref 8–23)
CALCIUM SERPL-MCNC: 9 MG/DL (ref 8.7–10.5)
CHLORIDE SERPL-SCNC: 103 MMOL/L (ref 95–110)
CO2 SERPL-SCNC: 22 MMOL/L (ref 23–29)
CREAT SERPL-MCNC: 0.9 MG/DL (ref 0.5–1.4)
DIFFERENTIAL METHOD: ABNORMAL
EOSINOPHIL # BLD AUTO: 0 K/UL (ref 0–0.5)
EOSINOPHIL NFR BLD: 0.2 % (ref 0–8)
ERYTHROCYTE [DISTWIDTH] IN BLOOD BY AUTOMATED COUNT: 12.5 % (ref 11.5–14.5)
EST. GFR  (NO RACE VARIABLE): >60 ML/MIN/1.73 M^2
GLUCOSE SERPL-MCNC: 91 MG/DL (ref 70–110)
GROUP A STREP, MOLECULAR: NEGATIVE
HCT VFR BLD AUTO: 40.4 % (ref 37–48.5)
HETEROPH AB SERPL QL IA: NEGATIVE
HGB BLD-MCNC: 13.5 G/DL (ref 12–16)
IMM GRANULOCYTES # BLD AUTO: 0.03 K/UL (ref 0–0.04)
IMM GRANULOCYTES NFR BLD AUTO: 0.4 % (ref 0–0.5)
INFLUENZA A, MOLECULAR: POSITIVE
INFLUENZA B, MOLECULAR: NEGATIVE
LYMPHOCYTES # BLD AUTO: 1.6 K/UL (ref 1–4.8)
LYMPHOCYTES NFR BLD: 19.2 % (ref 18–48)
MCH RBC QN AUTO: 32.5 PG (ref 27–31)
MCHC RBC AUTO-ENTMCNC: 33.4 G/DL (ref 32–36)
MCV RBC AUTO: 97 FL (ref 82–98)
MONOCYTES # BLD AUTO: 0.7 K/UL (ref 0.3–1)
MONOCYTES NFR BLD: 8.7 % (ref 4–15)
NEUTROPHILS # BLD AUTO: 5.8 K/UL (ref 1.8–7.7)
NEUTROPHILS NFR BLD: 71.3 % (ref 38–73)
NRBC BLD-RTO: 0 /100 WBC
PLATELET # BLD AUTO: 251 K/UL (ref 150–450)
PMV BLD AUTO: 9.2 FL (ref 9.2–12.9)
POTASSIUM SERPL-SCNC: 4 MMOL/L (ref 3.5–5.1)
PROT SERPL-MCNC: 7.1 G/DL (ref 6–8.4)
RBC # BLD AUTO: 4.16 M/UL (ref 4–5.4)
RSV AG SPEC QL IA: NEGATIVE
SARS-COV-2 RDRP RESP QL NAA+PROBE: NEGATIVE
SODIUM SERPL-SCNC: 136 MMOL/L (ref 136–145)
SPECIMEN SOURCE: ABNORMAL
SPECIMEN SOURCE: NORMAL
WBC # BLD AUTO: 8.14 K/UL (ref 3.9–12.7)

## 2023-12-14 PROCEDURE — 87502 INFLUENZA DNA AMP PROBE: CPT | Performed by: EMERGENCY MEDICINE

## 2023-12-14 PROCEDURE — 86308 HETEROPHILE ANTIBODY SCREEN: CPT | Performed by: EMERGENCY MEDICINE

## 2023-12-14 PROCEDURE — 36415 COLL VENOUS BLD VENIPUNCTURE: CPT | Performed by: EMERGENCY MEDICINE

## 2023-12-14 PROCEDURE — 25500020 PHARM REV CODE 255

## 2023-12-14 PROCEDURE — 25000003 PHARM REV CODE 250: Performed by: EMERGENCY MEDICINE

## 2023-12-14 PROCEDURE — 85025 COMPLETE CBC W/AUTO DIFF WBC: CPT | Performed by: EMERGENCY MEDICINE

## 2023-12-14 PROCEDURE — 99285 EMERGENCY DEPT VISIT HI MDM: CPT | Mod: 25

## 2023-12-14 PROCEDURE — U0002 COVID-19 LAB TEST NON-CDC: HCPCS | Performed by: EMERGENCY MEDICINE

## 2023-12-14 PROCEDURE — 87634 RSV DNA/RNA AMP PROBE: CPT | Performed by: EMERGENCY MEDICINE

## 2023-12-14 PROCEDURE — 87651 STREP A DNA AMP PROBE: CPT | Performed by: EMERGENCY MEDICINE

## 2023-12-14 PROCEDURE — 80053 COMPREHEN METABOLIC PANEL: CPT | Performed by: EMERGENCY MEDICINE

## 2023-12-14 RX ORDER — CIPROFLOXACIN 500 MG/1
500 TABLET ORAL 2 TIMES DAILY
Qty: 20 TABLET | Refills: 0 | Status: SHIPPED | OUTPATIENT
Start: 2023-12-14 | End: 2023-12-24

## 2023-12-14 RX ORDER — METRONIDAZOLE 500 MG/1
500 TABLET ORAL 3 TIMES DAILY
Qty: 21 TABLET | Refills: 0 | Status: SHIPPED | OUTPATIENT
Start: 2023-12-14 | End: 2023-12-21

## 2023-12-14 RX ADMIN — IOHEXOL 75 ML: 350 INJECTION, SOLUTION INTRAVENOUS at 11:12

## 2023-12-14 RX ADMIN — SODIUM CHLORIDE 1000 ML: 9 INJECTION, SOLUTION INTRAVENOUS at 10:12

## 2023-12-14 NOTE — ED PROVIDER NOTES
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  Has a history of diverticulitis and wants to make sure she does not have an intra-abdominal abscess given her left upper quadrant pain.  Denies any diarrhea but states does not usually get diarrhea with her diverticulitis.  Recently see the Rocephin shot and tested negative for COVID and flu on Sunday.  Review of patient's allergies indicates:  No Known Allergies  Past Medical History:   Diagnosis Date    Cataract     Hyperlipidemia     Hypertension      Past Surgical History:   Procedure Laterality Date    APPENDECTOMY      CATARACT EXTRACTION W/  INTRAOCULAR LENS IMPLANT Right 10/24/2018    Procedure: EXTRACTION, CATARACT, WITH IOL INSERTION;  Surgeon: Marito Boyle II, MD;  [facility];  Service: Ophthalmology;  Laterality: Right;    CATARACT EXTRACTION W/  INTRAOCULAR LENS IMPLANT Left 11/14/2018    Procedure: EXTRACTION, CATARACT, WITH IOL INSERTION;  Surgeon: Marito Boyle II, MD;  [facility];  Service: Ophthalmology;  Laterality: Left;    Cholecystectomy  15 yrs ago    CHOLECYSTECTOMY      COLON SURGERY      HYSTERECTOMY  21yrs ago    LAMINECTOMY  21yrs ago    OOPHORECTOMY  21yrs ago    SPINE SURGERY      TUBAL LIGATION  26yrs ago     Family History   Problem Relation Age of Onset    Cancer Mother     Stroke Mother     Breast cancer Mother     COPD Father     Hearing loss Father     Heart disease Father     Hyperlipidemia Father      Hypertension Father     Breast cancer Maternal Aunt     Arthritis Maternal Grandmother     Stroke Maternal Grandmother     Cancer Maternal Grandfather     Arthritis Paternal Grandmother     Depression Paternal Grandmother     Diabetes Paternal Grandmother     Alcohol abuse Paternal Grandfather      Social History     Tobacco Use    Smoking status: Light Smoker    Smokeless tobacco: Never   Substance Use Topics    Alcohol use: No    Drug use: No     Review of Systems   Constitutional:  Positive for activity change, appetite change, chills, fatigue and fever.   HENT:  Positive for congestion and sore throat.    Respiratory:  Positive for cough. Negative for shortness of breath.    Cardiovascular:  Negative for chest pain.   Gastrointestinal:  Positive for abdominal pain. Negative for nausea.   Genitourinary:  Negative for dysuria.   Musculoskeletal:  Positive for back pain, myalgias and neck pain.   Skin:  Negative for rash.   Neurological:  Negative for weakness.   Hematological:  Does not bruise/bleed easily.       Physical Exam     Initial Vitals [12/14/23 0916]   BP Pulse Resp Temp SpO2   (!) 92/55 84 19 98.3 °F (36.8 °C) 99 %      MAP       --         Physical Exam    Constitutional: Vital signs are normal. She appears well-developed and well-nourished.  Non-toxic appearance. No distress.   HENT:   Head: Normocephalic and atraumatic.   Eyes: EOM are normal. Pupils are equal, round, and reactive to light.   Neck: Neck supple. No JVD present.   Normal range of motion.  Cardiovascular:  Normal rate, regular rhythm, normal heart sounds and intact distal pulses.     Exam reveals no gallop and no friction rub.       No murmur heard.  Pulmonary/Chest: Breath sounds normal. No stridor. She has no wheezes. She has no rhonchi. She has no rales.   Abdominal: Abdomen is soft. Bowel sounds are normal. There is no splenomegaly or hepatomegaly. There is abdominal tenderness in the left upper quadrant. There is no rebound and  no guarding.   Musculoskeletal:         General: Normal range of motion.      Cervical back: Normal range of motion and neck supple. No rigidity.     Neurological: She is alert and oriented to person, place, and time. She has normal strength and normal reflexes. No cranial nerve deficit or sensory deficit. She exhibits normal muscle tone. Coordination normal. GCS eye subscore is 4. GCS verbal subscore is 5. GCS motor subscore is 6.   Skin: Skin is warm and dry.   Psychiatric: She has a normal mood and affect. Her speech is normal and behavior is normal. She is not actively hallucinating.         ED Course   Procedures  Labs Reviewed   INFLUENZA A & B BY MOLECULAR - Abnormal; Notable for the following components:       Result Value    Influenza A, Molecular Positive (*)     All other components within normal limits    Narrative:     Influenza A critical result(s) called and verbal readback obtained   from Marito Meier.   by TANIA 12/14/2023 10:34   CBC W/ AUTO DIFFERENTIAL - Abnormal; Notable for the following components:    MCH 32.5 (*)     All other components within normal limits   COMPREHENSIVE METABOLIC PANEL - Abnormal; Notable for the following components:    CO2 22 (*)     All other components within normal limits   GROUP A STREP, MOLECULAR   SARS-COV-2 RNA AMPLIFICATION, QUAL   RSV ANTIGEN DETECTION   HETEROPHILE AB SCREEN          Imaging Results              CT Abdomen Pelvis With IV Contrast NO Oral Contrast (Final result)  Result time 12/14/23 11:19:14      Final result by Destiny Bunch MD (12/14/23 11:19:14)                   Impression:      Diverticulosis and findings suggesting very mild acute diverticulitis descending colon without perforation or abscess.    Additional findings as detailed above including prior cholecystectomy.  Prior hysterectomy.  Nonvisualization of the appendix.  Mild bladder prolapse into the vagina      Electronically signed by: Destiny Bunch  MD  Date:    12/14/2023  Time:    11:19               Narrative:    EXAMINATION:  CT ABDOMEN PELVIS WITH IV CONTRAST    CLINICAL HISTORY:  LLQ abdominal pain;    TECHNIQUE:  Low dose axial images, sagittal and coronal reformations were obtained from the lung bases to the pubic symphysis following the IV administration of 75 mL of Omnipaque 350 no p.o. contrast    COMPARISON:  03/30/2023    FINDINGS:  Dependent hypoventilatory changes in visualized lung bases    Liver and spleen unremarkable appearance.  There are cholecystectomy clips.  No biliary duct dilatation.    Pancreas unremarkable appearance    Abdominal aorta no aneurysm    Stomach, bowel, mesentery; prominent amount of stool within the colon.  Appendix not visualized but no abnormal appendix is seen and appearance suggest prior appendectomy.  Diverticulosis.  Slight pericolonic fat stranding around short segment of descending colon for example axial series 2 images 59 through 63 which has become apparent since the prior images suggesting mild acute diverticulitis.  No abscess.  No free intraperitoneal air or fluid.  Duodenal diverticulum.    Kidneys, ureters, bladder; symmetrical renal enhancement and no hydronephrosis.  Urinary bladder mildly distended at time of the exam being mildly prolapse into the vagina.  Intrinsically unremarkable appearance.    Prior hysterectomy.  Adnexal region unremarkable in appearance    Small infraumbilical fat containing ventral hernia.                                       X-Ray Chest PA And Lateral (Final result)  Result time 12/14/23 09:55:25      Final result by Kanchan Hidalgo MD (12/14/23 09:55:25)                   Impression:      No acute abnormality.      Electronically signed by: Kanchan Hidalgo MD  Date:    12/14/2023  Time:    09:55               Narrative:    EXAMINATION:  XR CHEST PA AND LATERAL    CLINICAL HISTORY:  Cough, unspecified    TECHNIQUE:  PA and lateral views of the chest were  performed.    COMPARISON:  02/05/2019    FINDINGS:  The lungs are clear, with normal appearance of pulmonary vasculature and no pleural effusion or pneumothorax.    The cardiac silhouette is normal in size. The hilar and mediastinal contours are unremarkable.    Bones are intact.                                       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  Has a history of diverticulitis and wants to make sure she does not have an intra-abdominal abscess given her left upper quadrant pain.  Denies any diarrhea but states does not usually get diarrhea with her diverticulitis.     Examination she has no meningismus.  She has left upper quadrant tenderness and no evidence of strep throat.  Differential diagnosis includes viral syndrome, diverticulitis, pneumonia.  Group a strep is negative.  She has normal white blood cell count of 8 K.  Creatinine is normal 0.9.  Monospot is negative.  COVID-19 is negative.  Influenza a is positive.  RSV is negative.  CT abdomen pelvis shows mild diverticular lysis of the descending colon without perforation or abscess.  Patient is out of the window to start Tamiflu.  Will treat with ciprofloxacin and Flagyl for mild diverticulitis and rest at home.  Return precautions discussed.  Discharged in  No acute distress.    Amount and/or Complexity of Data Reviewed  Labs: ordered.  Radiology: ordered.    Risk  Prescription drug management.                                      Clinical Impression:  Final diagnoses:  [R05.9] Cough  [J10.1] Influenza A  (Primary)  [K57.32] Diverticulitis of large intestine without perforation or abscess without bleeding          ED Disposition Condition    Discharge Stable          ED Prescriptions       Medication Sig Dispense Start Date End Date Auth. Provider    ciprofloxacin HCl (CIPRO) 500 MG tablet Take 1 tablet (500 mg total) by mouth 2 (two) times daily. for 10 days 20 tablet 12/14/2023 12/24/2023 Kosta Redman MD    metroNIDAZOLE (FLAGYL) 500 MG tablet Take 1 tablet (500 mg total) by mouth 3 (three) times daily. for 7 days 21 tablet 12/14/2023 12/21/2023 Kosta Redman MD          Follow-up Information       Follow up With Specialties Details Why Contact Info Additional Information    [facility]  As needed, If symptoms worsen [address] [place] [address] [address] [place] [phone] 1st floor             Kosta Redman MD  12/14/23 1654

## 2023-12-14 NOTE — DISCHARGE INSTRUCTIONS
You may take over-the-counter medications such as TheraFlu, Tylenol cold and flu or ibuprofen/ Tylenol to help alleviate your symptoms.

## 2023-12-14 NOTE — Clinical Note
"Mavis "Ankit Nettles was seen and treated in our emergency department on 12/14/2023.  She may return to work on 12/20/2023.       If you have any questions or concerns, please don't hesitate to call.      Kosta Redman MD"

## 2024-02-14 DIAGNOSIS — R19.04 ABDOMINAL SWELLING, LEFT LOWER QUADRANT: Primary | ICD-10-CM

## 2024-02-14 DIAGNOSIS — R10.12 ABDOMINAL PAIN, LEFT UPPER QUADRANT: ICD-10-CM

## 2024-02-19 ENCOUNTER — HOSPITAL ENCOUNTER (OUTPATIENT)
Dept: RADIOLOGY | Facility: HOSPITAL | Age: 69
Discharge: HOME | End: 2024-02-19
Attending: INTERNAL MEDICINE
Payer: MEDICARE

## 2024-02-19 DIAGNOSIS — R10.12 ABDOMINAL PAIN, LEFT UPPER QUADRANT: ICD-10-CM

## 2024-02-19 DIAGNOSIS — R19.04 ABDOMINAL SWELLING, LEFT LOWER QUADRANT: ICD-10-CM

## 2024-02-19 LAB
CREAT SERPL-MCNC: 0.8 MG/DL (ref 0.5–1.4)
SAMPLE: NORMAL

## 2024-02-19 PROCEDURE — 25500020 PHARM REV CODE 255: Mod: PO | Performed by: INTERNAL MEDICINE

## 2024-02-19 PROCEDURE — 74178 CT ABD&PLV WO CNTR FLWD CNTR: CPT | Mod: TC,PO

## 2024-02-19 PROCEDURE — 82565 ASSAY OF CREATININE: CPT | Mod: PO

## 2024-02-19 RX ADMIN — IOHEXOL 100 ML: 350 INJECTION, SOLUTION INTRAVENOUS at 01:02

## 2025-02-12 DIAGNOSIS — M25.561 PAIN IN RIGHT KNEE: Primary | ICD-10-CM

## 2025-02-19 ENCOUNTER — HOSPITAL ENCOUNTER (OUTPATIENT)
Dept: RADIOLOGY | Facility: HOSPITAL | Age: 70
Discharge: HOME | End: 2025-02-19
Attending: INTERNAL MEDICINE
Payer: MEDICARE

## 2025-02-19 DIAGNOSIS — M25.561 PAIN IN RIGHT KNEE: ICD-10-CM

## 2025-02-19 PROCEDURE — 73721 MRI JNT OF LWR EXTRE W/O DYE: CPT | Mod: TC,PO,RT

## 2025-04-04 DIAGNOSIS — M25.561 RIGHT KNEE PAIN, UNSPECIFIED CHRONICITY: Primary | ICD-10-CM

## 2025-04-10 ENCOUNTER — HOSPITAL ENCOUNTER (OUTPATIENT)
Dept: RADIOLOGY | Facility: HOSPITAL | Age: 70
Discharge: HOME | End: 2025-04-10
Attending: ORTHOPAEDIC SURGERY
Payer: MEDICARE

## 2025-04-10 ENCOUNTER — OFFICE VISIT (OUTPATIENT)
Dept: ORTHOPEDICS | Facility: CLINIC | Age: 70
End: 2025-04-10
Payer: MEDICARE

## 2025-04-10 VITALS — RESPIRATION RATE: 18 BRPM | WEIGHT: 182.13 LBS | HEIGHT: 68 IN | BODY MASS INDEX: 27.6 KG/M2

## 2025-04-10 DIAGNOSIS — M17.10 ARTHRITIS OF KNEE: ICD-10-CM

## 2025-04-10 DIAGNOSIS — M25.561 RIGHT KNEE PAIN, UNSPECIFIED CHRONICITY: ICD-10-CM

## 2025-04-10 DIAGNOSIS — M25.561 RIGHT KNEE PAIN, UNSPECIFIED CHRONICITY: Primary | ICD-10-CM

## 2025-04-10 PROCEDURE — 99213 OFFICE O/P EST LOW 20 MIN: CPT | Mod: PBBFAC,25,PO | Performed by: ORTHOPAEDIC SURGERY

## 2025-04-10 PROCEDURE — 99999 PR PBB SHADOW E&M-EST. PATIENT-LVL III: CPT | Mod: PBBFAC,,, | Performed by: ORTHOPAEDIC SURGERY

## 2025-04-10 PROCEDURE — 73564 X-RAY EXAM KNEE 4 OR MORE: CPT | Mod: 26,RT,, | Performed by: RADIOLOGY

## 2025-04-10 PROCEDURE — 99204 OFFICE O/P NEW MOD 45 MIN: CPT | Mod: S$PBB,,, | Performed by: ORTHOPAEDIC SURGERY

## 2025-04-10 PROCEDURE — 73562 X-RAY EXAM OF KNEE 3: CPT | Mod: 26,59,LT, | Performed by: RADIOLOGY

## 2025-04-10 PROCEDURE — 73562 X-RAY EXAM OF KNEE 3: CPT | Mod: TC,PO,LT

## 2025-04-10 NOTE — PROGRESS NOTES
Past Medical History:   Diagnosis Date    Cataract     Hyperlipidemia     Hypertension        Past Surgical History:   Procedure Laterality Date    APPENDECTOMY      CATARACT EXTRACTION W/  INTRAOCULAR LENS IMPLANT Right 10/24/2018    Procedure: EXTRACTION, CATARACT, WITH IOL INSERTION;  Surgeon: Marito Boyle II, MD;  Location: [facility] OR;  Service: Ophthalmology;  Laterality: Right;    CATARACT EXTRACTION W/  INTRAOCULAR LENS IMPLANT Left 11/14/2018    Procedure: EXTRACTION, CATARACT, WITH IOL INSERTION;  Surgeon: Marito Boyle II, MD;  Location: [facility] OR;  Service: Ophthalmology;  Laterality: Left;    Cholecystectomy  15 yrs ago    CHOLECYSTECTOMY      COLON SURGERY      HYSTERECTOMY  21yrs ago    LAMINECTOMY  21yrs ago    OOPHORECTOMY  21yrs ago    SPINE SURGERY      TUBAL LIGATION  26yrs ago       Current Outpatient Medications   Medication Sig    clonazePAM (KLONOPIN) 1 MG tablet Take 1 mg by mouth nightly as needed for Insomnia.    losartan (COZAAR) 100 MG tablet TAKE 1 TABLET BY MOUTH EVERY DAY    mupirocin (BACTROBAN) 2 % ointment Apply topically 3 (three) times daily.    spironolactone (ALDACTONE) 50 MG tablet TAKE 1 TABLET BY MOUTH EVERY DAY     No current facility-administered medications for this visit.       Review of patient's allergies indicates:  No Known Allergies    Family History   Problem Relation Name Age of Onset    Cancer Mother      Stroke Mother      Breast cancer Mother      COPD Father      Hearing loss Father      Heart disease Father      Hyperlipidemia Father      Hypertension Father      Breast cancer Maternal Aunt      Arthritis Maternal Grandmother      Stroke Maternal Grandmother      Cancer Maternal Grandfather      Arthritis Paternal Grandmother      Depression Paternal Grandmother      Diabetes Paternal Grandmother      Alcohol abuse Paternal Grandfather         Social History[1]    Chief Complaint:   Chief Complaint   Patient presents with    Right Knee - Pain       History of  present illness:  69-year-old female seen for right knee pain.  Patient has had knee pain since about January.  There was no specific injury or trauma.  Patient saw Dr. Ferrara who told her she had pretty significant knee arthritis.  She did have an MRI which showed a degenerative medial meniscal tear as well as significant medial compartment arthritis.  Talked to her about possible viscosupplementation.  Talked to her about knee replacement.  She got a cortisone injection last week with tennis helped tremendously.  Pain was a 9/10 and down to a 1/10.  She has also done about 5 weeks physical therapy.    Prior treatment:  Physical therapy and cortisone injection        Review of Systems:    Constitution: Negative for chills, fever, and sweats.  Negative for unexplained weight loss.    HENT:  Negative for headaches and blurry vision.    Cardiovascular:Negative for chest pain or irregular heart beat. Negative for hypertension.    Respiratory:  Negative for cough and shortness of breath.    Gastrointestinal: Negative for abdominal pain, heartburn, melena, nausea, and vomitting.    Genitourinary:  Negative bladder incontinence and dysuria.    Musculoskeletal:  See HPI    Neurological: Negative for numbness.    Psychiatric/Behavioral: Negative for depression.  The patient is not nervous/anxious.      Endocrine: Negative for polyuria    Hematologic/Lymphatic: Negative for bleeding problem.  Does not bruise/bleed easily.    Skin: Negative for poor would healing and rash      Physical Examination:    Vital Signs:    Vitals:    04/10/25 0834   Resp: 18       Body mass index is 27.69 kg/m².    This a well-developed, well nourished patient in no acute distress.  They are alert and oriented and cooperative to examination.  Pt. walks without an antalgic gait.      Examination of the right knee shows no rashes or erythema. There are no masses ecchymosis or effusion. Patient has full range of motion from 0-130°. Patient is  nontender to palpation over lateral joint line and moderately tender to palpation over the medial joint line. Patient has a - Lachman exam, - anterior drawer exam, and - posterior drawer exam. - Apley exam. Knee is stable to varus and valgus stress. 5 out of 5 motor strength. Palpable distal pulses. Intact light touch sensation. Negative Patellofemoral crepitus      X-rays:  X-rays of the right knee are ordered and reviewed which show some moderate to severe medial joint space narrowing of the right knee.  More mild medial narrowing of the left knee    MRI of the right knee is reviewed and interpreted:Osteoarthritic changes, most notably involving the medial compartment.  Nondisplaced tear of the body/posterior horn of the medial meniscus.  Small joint effusion.  There is question of tiny loose bodies near the intercondylar notch.     Assessment:  Right knee arthritis    Plan:  I reviewed the findings with her today.  I agree with most of what the other doctor said.  I would definitely tried to stay away from knee replacement if we can.  I think viscosupplementation would be a good option if the cortisone starts to wear out.  Not the best of candidate for a knee arthroscopy given the amount of arthritis she has.  If surgery is needed, knee replacement is not unreasonable.  Gave her information about knee arthritis as well as strategies on preventing flare ups.            All previous pertinent notes including ER visits, physical therapy visits, other orthopedic visits as well as other care for the same musculoskeletal problem were reviewed.  All pertinent lab values and previous imaging was reviewed pertinent to the current visit.    This note was created using [employer] voice recognition software that occasionally misinterpreted phrases or words.    Consult note is delivered via Epic messaging service.           [1]   Social History  Socioeconomic History    Marital status:    Tobacco Use    Smoking status:  Light Smoker    Smokeless tobacco: Never   Substance and Sexual Activity    Alcohol use: No    Drug use: No    Sexual activity: Never     Social Drivers of Health     Financial Resource Strain: Low Risk  (10/4/2022)    Received from [facility]    Overall Financial Resource Strain (CARDIA)     Difficulty of Paying Living Expenses: Not hard at all   Food Insecurity: No Food Insecurity (10/4/2022)    Received from [facility]    Hunger Vital Sign     Worried About Running Out of Food in the Last Year: Never true     Ran Out of Food in the Last Year: Never true   Transportation Needs: No Transportation Needs (10/4/2022)    Received from [facility]    PRAPARE - Transportation     Lack of Transportation (Medical): No     Lack of Transportation (Non-Medical): No   Physical Activity: Insufficiently Active (10/4/2022)    Received from [facility]    Exercise Vital Sign     Days of Exercise per Week: 2 days     Minutes of Exercise per Session: 10 min   Stress: No Stress Concern Present (10/4/2022)    Received from [facility]    [employer] [place] [employer] - Occupational Stress Questionnaire     Feeling of Stress : Not at all   Housing Stability: Low Risk  (10/4/2022)    Received from [facility]    Housing Stability Vital Sign     Unable to Pay for Housing in the Last Year: No     Number of Places Lived in the Last Year: 1     Unstable Housing in the Last Year: No

## (undated) DEVICE — KNIFE SLIT PHACO 2.4MM

## (undated) DEVICE — SYS LABEL CORRECT MED

## (undated) DEVICE — SEE L#120831

## (undated) DEVICE — APPLICATOR STERILE 6IN 100/CA

## (undated) DEVICE — FILTER .2MCRN 45CC STRL DISP

## (undated) DEVICE — PACK CUSTOM EYE KIT

## (undated) DEVICE — SYR 10CC LUER LOCK

## (undated) DEVICE — CARTRIDGE LENS D

## (undated) DEVICE — SYR LUER LOCK 1CC

## (undated) DEVICE — CYSTOTOME IRRIG 24G 13MM

## (undated) DEVICE — SOL WATER STRL IRR 1000ML

## (undated) DEVICE — SLEEVE ULTRA INFUSION

## (undated) DEVICE — DRESSING TRANS 2X2 TEGADERM

## (undated) DEVICE — TIP KELMAN 30 DEGREE

## (undated) DEVICE — DRAPE OPTHALMIC W/POUCH

## (undated) DEVICE — SHIELD EYE PLASTIC 3100G

## (undated) DEVICE — NDL HYPODERMIC BLUNT 18G 1.5IN

## (undated) DEVICE — SOL SOD HYLR VISC INJ .85

## (undated) DEVICE — KNIFE MICRO 15 DEG X 3MM DISP